# Patient Record
Sex: MALE | Race: WHITE | NOT HISPANIC OR LATINO | Employment: OTHER | URBAN - METROPOLITAN AREA
[De-identification: names, ages, dates, MRNs, and addresses within clinical notes are randomized per-mention and may not be internally consistent; named-entity substitution may affect disease eponyms.]

---

## 2017-02-28 ENCOUNTER — PRE VISIT (OUTPATIENT)
Dept: UROLOGY | Facility: CLINIC | Age: 67
End: 2017-02-28

## 2017-02-28 NOTE — TELEPHONE ENCOUNTER
Patient with BPH coming in for follow up. Chart reviewed and pt was to have UDS and then follow up. Message lef notifying pt that our schedulers would be contacting him to schedule that appointment and reschedule his appointment with Dr. Colorado. Message sent to schedulers.

## 2017-03-03 ENCOUNTER — TRANSFERRED RECORDS (OUTPATIENT)
Dept: HEALTH INFORMATION MANAGEMENT | Facility: CLINIC | Age: 67
End: 2017-03-03

## 2017-03-03 ENCOUNTER — OFFICE VISIT (OUTPATIENT)
Dept: UROLOGY | Facility: CLINIC | Age: 67
End: 2017-03-03

## 2017-03-03 VITALS
HEIGHT: 74 IN | SYSTOLIC BLOOD PRESSURE: 118 MMHG | WEIGHT: 190.4 LBS | BODY MASS INDEX: 24.43 KG/M2 | HEART RATE: 62 BPM | DIASTOLIC BLOOD PRESSURE: 75 MMHG

## 2017-03-03 DIAGNOSIS — N40.1 BENIGN NON-NODULAR PROSTATIC HYPERPLASIA WITH LOWER URINARY TRACT SYMPTOMS: Primary | ICD-10-CM

## 2017-03-03 DIAGNOSIS — R39.15 URGENCY OF URINATION: ICD-10-CM

## 2017-03-03 RX ORDER — TROSPIUM CHLORIDE ER 60 MG/1
60 CAPSULE ORAL EVERY MORNING
Qty: 30 EACH | Refills: 3 | Status: ON HOLD | OUTPATIENT
Start: 2017-03-03 | End: 2019-07-25

## 2017-03-03 RX ORDER — OMEPRAZOLE 40 MG/1
40 CAPSULE, DELAYED RELEASE ORAL
COMMUNITY
Start: 2017-01-27

## 2017-03-03 ASSESSMENT — PAIN SCALES - GENERAL: PAINLEVEL: NO PAIN (0)

## 2017-03-03 NOTE — MR AVS SNAPSHOT
After Visit Summary   3/3/2017    Sidney Preciado    MRN: [id number]           Patient Information     Date Of Birth          1950        Visit Information        Provider Department      3/3/2017 2:00 PM Rolly Colorado MD [facility]        Today's Diagnoses     Benign non-nodular prostatic hyperplasia with lower urinary tract symptoms    -  1    Urgency of urination          Care Instructions    Please follow up with  after doing your UDS   Please get a PSA today         Follow-ups after your visit        Follow-up notes from your care team     Return in about 2 months (around 5/3/2017).      Your next 10 appointments already scheduled     Mar 22, 2017  9:30 AM CDT   (Arrive by 9:15 AM)   Urodynamics with  Uro Procedure Nurse   [facility] ([facility])    [address] [place] [id number] [phone]            Mar 22, 2017 11:40 AM CDT   (Arrive by 11:25 AM)   Return Visit with Rolly Colorado MD   [facility] ([facility])    [address] [place] [id number] [phone]              Who to contact     Please call your clinic at [phone] to:    Ask questions about your health    Make or cancel appointments    Discuss your medicines    Learn about your test results    Speak to your doctor   If you have compliments or concerns about an experience at your clinic, or if you wish to file a complaint, please contact [facility] Patient Relations at [phone] or email us at [email or web address]         Additional Information About Your Visit        MyChart Information     makeenat gives you secure access to your electronic health record. If you see a primary care provider, you can also send  "messages to your care team and make appointments. If you have questions, please call your primary care clinic.  If you do not have a primary care provider, please call [phone] and they will assist you.      Onefeat is an electronic gateway that provides easy, online access to your medical records. With One[employer], you can request a clinic appointment, read your test results, renew a prescription or communicate with your care team.     To access your existing account, please contact your [facility] or call [phone] for assistance.        Care EveryWhere ID     This is your Care EveryWhere ID. This could be used by other organizations to access your [facility] medical records  [id number]        Your Vitals Were     Pulse Height BMI (Body Mass Index)             62 1.88 m (6' 2\") 24.45 kg/m2          Blood Pressure from Last 3 Encounters:   03/03/17 118/75   02/10/16 126/80   09/10/15 100/56    Weight from Last 3 Encounters:   03/03/17 86.4 kg (190 lb 6.4 oz)   02/10/16 84.2 kg (185 lb 11.2 oz)   09/10/15 83 kg (183 lb)                 Today's Medication Changes          These changes are accurate as of: 3/3/17 11:59 PM.  If you have any questions, ask your nurse or doctor.               These medicines have changed or have updated prescriptions.        Dose/Directions    * trospium 60 MG Cp24 24 hr capsule   Commonly known as:  SANCTURA XR   This may have changed:  Another medication with the same name was added. Make sure you understand how and when to take each.   Used for:  Elevated prostate specific antigen (PSA), BPH (benign prostatic hyperplasia)   Changed by:  Rolly Colorado MD        Dose:  60 mg   Take 1 capsule (60 mg) by mouth every morning   Quantity:  90 each   Refills:  0       * trospium 60 MG Cp24 24 hr capsule   Commonly known as:  SANCTURA XR   This may have changed:  You were already taking a medication with the same name, and this prescription was added. " Make sure you understand how and when to take each.   Used for:  Benign non-nodular prostatic hyperplasia with lower urinary tract symptoms, Urgency of urination   Changed by:  Rolly Colorado MD        Dose:  60 mg   Take 1 capsule (60 mg) by mouth every morning   Quantity:  30 each   Refills:  3       * Notice:  This list has 2 medication(s) that are the same as other medications prescribed for you. Read the directions carefully, and ask your doctor or other care provider to review them with you.      Stop taking these medicines if you haven't already. Please contact your care team if you have questions.     ATIVAN PO   Stopped by:  Rolly Colorado MD           dutasteride 0.5 MG capsule   Commonly known as:  AVODART   Stopped by:  Rolly Colorado MD           FLOMAX 0.4 MG capsule   Generic drug:  tamsulosin   Stopped by:  Rolly Colorado MD                Where to get your medicines      These medications were [facility] to [facility] [place] - [address]   [address] [address]    Hours:  24-hours Phone:  [phone]     trospium 60 MG Cp24 24 hr capsule                Primary Care Provider Office Phone # Fax #    [facility] [phone]       [address] [place] [address].  [facility] [address]        Thank you!     Thank you for choosing [facility] UROLOG[facility] AND [facility] FOR PROSTATE AND UROLOGIC CANCERS  for your care. Our goal is always to provide you with excellent care. Hearing back from our patients is one way we can continue to improve our services. Please take a few minutes to complete the written survey that you may receive in the mail after your visit with us. Thank you!             Your Updated Medication List - Protect others around you: Learn how to safely use, store and throw away your medicines at www.disposemymeds.org.          This list is accurate as of: 3/3/17 11:59 PM.  Always use your most recent med list.                    Brand Name Dispense Instructions for use    AMBIEN PO          ciprofloxacin 500 MG tablet    CIPRO    6 tablet    Take one tablet twice daily for 3 days.       diazepam 10 MG tablet    VALIUM    1 tablet    Take 1 tablet (10 mg) by mouth every 6 hours as needed for anxiety or sleep Take 30-60 minutes before procedure.  Do not operate a vehicle after taking this medication.       multivitamin, therapeutic Tabs tablet      Take 1 tablet by mouth daily       omeprazole 40 MG capsule    priLOSEC     Take 40 mg by mouth       * trospium 60 MG Cp24 24 hr capsule    SANCTURA XR    90 each    Take 1 capsule (60 mg) by mouth every morning       * trospium 60 MG Cp24 24 hr capsule    SANCTURA XR    30 each    Take 1 capsule (60 mg) by mouth every morning       * Notice:  This list has 2 medication(s) that are the same as other medications prescribed for you. Read the directions carefully, and ask your doctor or other care provider to review them with you.

## 2017-03-03 NOTE — LETTER
3/3/2017       RE: Sidney Preciado  [address] [facility] [address]     Dear Colleague,    Thank you for referring your patient, Sidney Preciado, to the [facility] UROLOG[facility] AND [facility] FOR PROSTATE AND UROLOGIC CANCERS at [facility]. Please see a copy of my visit note below.    DIAGNOSIS:  BPH.      HISTORY OF PRESENT ILLNESS:  Mr. Preciado is a 66-year-old gentleman whom we saw about a year ago with symptoms of long-standing BPH, significant nocturia and daytime frequency.  He was on tamsulosin, dutasteride and trospium.  He was counseled to undergo a laser TURP, which he was averse to.  He then underwent an MRI in preparation for a PAE, which showed a prostate nodule.  He underwent a biopsy of this, which showed Stan 6 prostate cancer.  He also was recommended to undergo a UDS, but did not comply with that last year.  His prostate was about 85 g, but subsequently he has had a Rezum procedure elsewhere 09/2016.  He says his symptoms got better for a while, but now they are worse.  His biggest complaint is his nocturia.  He has, however, been able to get off his Flomax and dutasteride.  Right now, he is also scheduled for a urodynamic evaluation this month.  He otherwise denies any other significant symptoms, but is not very happy with the results of Rezum.        PHYSICAL EXAMINATION:     VITAL SIGNS:  He is afebrile.  Blood pressure 118/75, pulse 62, weight 86 kg.   ABDOMEN:  Benign.      DATA:  Uroflow today reveals a peak flow of 17.4 mL/sec, on a voided volume 418 mL with good parabolic curve.        ASSESSMENT AND PLAN:  Patient with BPH and the significant main complaint now being nocturia about 5 times a night.  His objective parameters seem to have gotten better given that he has had the Rezum procedure.  Some of the issue may be localized to irritation of the trigone.  He is on trospium and needs a refill, which we will give him today.  I would like to obtain urodynamics to  better understand the function of his bladder before we decide if he needs any additional therapy.       Again, thank you for allowing me to participate in the care of your patient.      Sincerely,    Rolly Colorado MD

## 2017-03-03 NOTE — PROGRESS NOTES
DIAGNOSIS:  BPH.      HISTORY OF PRESENT ILLNESS:  Mr. Preciado is a 66-year-old gentleman whom we saw about a year ago with symptoms of long-standing BPH, significant nocturia and daytime frequency.  He was on tamsulosin, dutasteride and trospium.  He was counseled to undergo a laser TURP, which he was averse to.  He then underwent an MRI in preparation for a PAE, which showed a prostate nodule.  He underwent a biopsy of this, which showed Stan 6 prostate cancer.  He also was recommended to undergo a UDS, but did not comply with that last year.  His prostate was about 85 g, but subsequently he has had a Rezum procedure elsewhere 09/2016.  He says his symptoms got better for a while, but now they are worse.  His biggest complaint is his nocturia.  He has, however, been able to get off his Flomax and dutasteride.  Right now, he is also scheduled for a urodynamic evaluation this month.  He otherwise denies any other significant symptoms, but is not very happy with the results of Rezum.        PHYSICAL EXAMINATION:     VITAL SIGNS:  He is afebrile.  Blood pressure 118/75, pulse 62, weight 86 kg.   ABDOMEN:  Benign.      DATA:  Uroflow today reveals a peak flow of 17.4 mL/sec, on a voided volume 418 mL with good parabolic curve.        ASSESSMENT AND PLAN:  Patient with BPH and the significant main complaint now being nocturia about 5 times a night.  His objective parameters seem to have gotten better given that he has had the Rezum procedure.  Some of the issue may be localized to irritation of the trigone.  He is on trospium and needs a refill, which we will give him today.  I would like to obtain urodynamics to better understand the function of his bladder before we decide if he needs any additional therapy.

## 2017-03-21 ENCOUNTER — PRE VISIT (OUTPATIENT)
Dept: UROLOGY | Facility: CLINIC | Age: 67
End: 2017-03-21

## 2017-03-21 NOTE — TELEPHONE ENCOUNTER
Patient with BPH and Rezum coming in to review UDS. Chart reviewed and all appointments are made. No need for a call.

## 2017-03-22 ENCOUNTER — OFFICE VISIT (OUTPATIENT)
Dept: UROLOGY | Facility: CLINIC | Age: 67
End: 2017-03-22

## 2017-03-22 ENCOUNTER — DOCUMENTATION ONLY (OUTPATIENT)
Dept: UROLOGY | Facility: CLINIC | Age: 67
End: 2017-03-22

## 2017-03-22 VITALS
HEART RATE: 77 BPM | BODY MASS INDEX: 23 KG/M2 | DIASTOLIC BLOOD PRESSURE: 73 MMHG | WEIGHT: 185 LBS | SYSTOLIC BLOOD PRESSURE: 121 MMHG | HEIGHT: 75 IN

## 2017-03-22 VITALS
HEIGHT: 74 IN | WEIGHT: 184 LBS | HEART RATE: 66 BPM | DIASTOLIC BLOOD PRESSURE: 70 MMHG | BODY MASS INDEX: 23.61 KG/M2 | SYSTOLIC BLOOD PRESSURE: 112 MMHG

## 2017-03-22 DIAGNOSIS — N40.1 BENIGN NODULAR PROSTATIC HYPERPLASIA WITH LOWER URINARY TRACT SYMPTOMS: Primary | ICD-10-CM

## 2017-03-22 DIAGNOSIS — N40.0 BPH (BENIGN PROSTATIC HYPERPLASIA): Primary | ICD-10-CM

## 2017-03-22 DIAGNOSIS — C61 MALIGNANT NEOPLASM OF PROSTATE (H): ICD-10-CM

## 2017-03-22 RX ORDER — TROSPIUM CHLORIDE ER 60 MG/1
60 CAPSULE ORAL
Status: ON HOLD | COMMUNITY
Start: 2017-03-03 | End: 2019-07-25

## 2017-03-22 ASSESSMENT — PAIN SCALES - GENERAL
PAINLEVEL: MILD PAIN (3)
PAINLEVEL: NO PAIN (0)
PAINLEVEL: NO PAIN (0)

## 2017-03-22 NOTE — PROGRESS NOTES
Sidney Preciado comes into clinic today at the request of Dr. Colorado for UDS.      This service provided today was under the supervising provider of the day Dr. Colorado, who was available if needed.    Kirit Marsh    INDICATIONS FOR PROCEDURE:  Mr. Sidney Preciado is a pleasant 66 year old male with BPH.     PROCEDURE:    1.Complex filling cystourethrogram with measurement of bladder and rectal pressures.  2.Complex voiding cystourethrogram with measurement of bladder and rectal pressures.  3.Electromyography during urodynamics.  4.Uroflowmetry.  5.Sterile urethral catheterization for measurement of postvoid residual urine volume.    VOIDING DIARY:  Patient did not provide voiding diary.    DESCRIPTION OF PROCEDURE:  Risks, benefits, and alternatives were discussed with the patient and they wished to proceed. Urodynamics is planned to better assess the primary etiology for Mr. Preciado's urologic dysfunction. After informed consent was obtained, the patient was taken to the procedure room where uroflowmetry was performed. Findings below. Next a triple-lumen urodynamics catheter was inserted in the bladder. A rectal manometry catheter was placed in the rectum. EMG pads were placed on either side of the anal verge. The bladder was filled with sterile saline at 25 cc/minute and serial pressures were recorded.     UROFLOW:  Voided volume: 233 ml  Maximum flow rate: 16.0 ml/sec  Average flow rate: 9.2 ml/sec  Post void Residual by catheter: 10 fr coude catheter inserted; no urine return achieved; a second attempt was made using 14 fr tiemann model coude; again no urine.  There was a few mls of bright red blood in catheter upon retraction.  There was no indication that the catheter was not advanced sufficiently to be in bladder. No urine was available for testing.    DURING THE FILLING PHASE:  At the following volumes the patient experienced:  First sensation: not reported   First Desire: 170 ml  Strong Desire: 188  ml  Maximum Capacity: 304 ml   Max Filling Detrusor Pressure: 306    DURING THE VOIDING PHASE:  Peak Detrusor Contraction:74.2 cmH2O  Voided volume: 352.0 ml  Maximum flow rate: 13.6 ml/sec  Average flow rate: 6.1 ml/sec  Post void Residual: 0 ml    A single ciprofloxacin antibiotic was provided for UTI prophylaxis per protocol, following completion of study.         Thank you for allowing me to participate in the care of  Mr. Sidney DOUGHERTY Ilana.  Kirit Marsh LPN    March 22, 2017

## 2017-03-22 NOTE — NURSING NOTE
Chief Complaint   Patient presents with     RECHECK     Follow up after UDS        Yandy Kelsey MA

## 2017-03-22 NOTE — PROGRESS NOTES
"Urology Clinic Note    Date: 3/22/2017  Time: 11:41 AM  Patient: Sidney Preciado  MRN: [id number]    HPI/Subjective:  Mr. Preciado is a 66-year-old man with a urologic history of very low risk prostate cancer diagnosed in September 2015  (Stan 3 + 3, 2 cores, PSA 2.53) on AS and bothersome LUTS s/p Rezum (9/2016) with persistent post-procedure LUTS who presents to review UDS results.      Initial urinary symptoms included weak stream, frequency (Q1H), urgency and nocturia (5-6X).  Since underoing Rezum the patient reports improved force of stream with minimal improvements in frequency (Q1.5 H), urgency and nocturia (4x).  He is currently on trospium and dutasteride.  In light of persistent symptoms, the patient returns today to review UDS results.    Today he denies recent fevers, chills, dysuria, hematuria, flanks pain and sensation of incomplete emptying.         /70  Pulse 66  Ht 1.88 m (6' 2\")  Wt 83.5 kg (184 lb)  BMI 23.62 kg/m2  Gen: In NAD, conversant  Resp: Breathing non-labored  Abd: Soft, non-distended, non-tender.  Ext: Warm and well perfused    UROFLOW:  Voided volume: 233 ml  Maximum flow rate: 16.0 ml/sec  Average flow rate: 9.2 ml/sec    DURING THE FILLING PHASE:  At the following volumes the patient experienced:  First sensation: not reported   First Desire: 170 ml  Strong Desire: 188 ml  Maximum Capacity: 304 ml   Max Filling Detrusor Pressure: 306     DURING THE VOIDING PHASE:  Peak Detrusor Contraction:74.2 cmH2O  Voided volume: 352.0 ml  Maximum flow rate: 13.6 ml/sec  Average flow rate: 6.1 ml/sec  Post void Residual: 0 ml       Assessment & Plan: Sidney Preciado is a 66 year old MALE with history of very low risk prostate cancer and persistent LUTS s/p Rezum who returns to clinic today for follow up after undergoing UDS.    1)  Prostate cancer  -Very low risk disease on active surveillance  -Repeat PSA today    2) LUTS  -Patient with known moderate prostatic enlargement and persistent " LUTS with UDS findings suggestive of obstructive voiding (decreased flow with increased voiding pressures) without evidence of associated detrusor overactivity.  In light of the foregoing, we will plan to perform cystoscopy to evaluate for residual obstruction in anticipation of possible surgical intervention.        Patient seen and examined with the resident.  Visit time 15 minutes and >50% spent in counseling.  I agree with the resident's note and plan of care.       Rolly Colorado MD  Urology Staff

## 2017-03-22 NOTE — LETTER
"3/22/2017       RE: Sidney Preciado  [address] [address]     Dear Colleague,    Thank you for referring your patient, Sidney Preciado, to the [facility] UROLOGY AND [facility] FOR PROSTATE AND UROLOGIC CANCERS at [facility]. Please see a copy of my visit note below.    Urology Clinic Note    Date: 3/22/2017  Time: 11:41 AM  Patient: Sidney Preciado  MRN: [id number]    HPI/Subjective:  Mr. Preciado is a 66-year-old man with a urologic history of very low risk prostate cancer diagnosed in September 2015  ([place] 3 + 3, 2 cores, PSA 2.53) on AS and bothersome LUTS s/p Rezum (9/2016) with persistent post-procedure LUTS who presents to review UDS results.      Initial urinary symptoms included weak stream, frequency (Q1H), urgency and nocturia (5-6X).  Since underoing Rezum the patient reports improved force of stream with minimal improvements in frequency (Q1.5 H), urgency and nocturia (4x).  He is currently on trospium and dutasteride.  In light of persistent symptoms, the patient returns today to review UDS results.    Today he denies recent fevers, chills, dysuria, hematuria, flanks pain and sensation of incomplete emptying.         /70  Pulse 66  Ht 1.88 m (6' 2\")  Wt 83.5 kg (184 lb)  BMI 23.62 kg/m2  Gen: In NAD, conversant  Resp: Breathing non-labored  Abd: Soft, non-distended, non-tender.  Ext: Warm and well perfused    UROFLOW:  Voided volume: 233 ml  Maximum flow rate: 16.0 ml/sec  Average flow rate: 9.2 ml/sec    DURING THE FILLING PHASE:  At the following volumes the patient experienced:  First sensation: not reported   First Desire: 170 ml  Strong Desire: 188 ml  Maximum Capacity: 304 ml   Max Filling Detrusor Pressure: 306     DURING THE VOIDING PHASE:  Peak Detrusor Contraction:74.2 cmH2O  Voided volume: 352.0 ml  Maximum flow rate: 13.6 ml/sec  Average flow rate: 6.1 ml/sec  Post void Residual: 0 ml       Assessment & Plan: Sidney Preciado is a 66 year old " MALE with history of very low risk prostate cancer and persistent LUTS s/p Rezum who returns to clinic today for follow up after undergoing UDS.    1)  Prostate cancer  -Very low risk disease on active surveillance  -Repeat PSA today    2) LUTS  -Patient with known moderate prostatic enlargement and persistent LUTS with UDS findings suggestive of obstructive voiding (decreased flow with increased voiding pressures) without evidence of associated detrusor overactivity.  In light of the foregoing, we will plan to perform cystoscopy to evaluate for residual obstruction in anticipation of possible surgical intervention.        Patient seen and examined with the resident.  Visit time 15 minutes and >50% spent in counseling.  I agree with the resident's note and plan of care.       Rolly Colorado MD  Urology Staff

## 2017-03-22 NOTE — MR AVS SNAPSHOT
After Visit Summary   3/22/2017    Sidney Preciado    MRN: [id number]           Patient Information     Date Of Birth          1950        Visit Information        Provider Department      3/22/2017 11:40 AM Rolly Colorado MD [facility] Urology and [facility] for Prostate and Urologic Cancers        Care Instructions    Please follow up with  for a cystoscopy and PSA     It was a pleasure meeting with you today.  Thank you for allowing me and my team the privilege of caring for you today.  YOU are the reason we are here, and I truly hope we provided you with the excellent service you deserve.  Please let us know if there is anything else we can do for you so that we can be sure you are leaving completely satisfied with your care experience.                Follow-ups after your visit        Your next 10 appointments already scheduled     Apr 26, 2017 10:00 AM CDT   LAB with  LAB   [facility] ([facility])    [address] [place] [address] [phone]           Patient must bring picture ID.  Patient should be prepared to give a urine specimen  Please do not eat 10-12 hours before your appointment if you are coming in fasting for labs on lipids, cholesterol, or glucose (sugar).  Pregnant women should follow their Care Team instructions. Water with medications is okay. Do not drink coffee or other fluids.   If you have concerns about taking  your medications, please ask at office or if scheduling via [facility], send a message by clicking on Secure Messaging, Message Your Care Team.            Apr 26, 2017 12:00 PM CDT   (Arrive by 11:45 AM)   Cystoscopy with Rolly Colorado MD   [facility] Urology and [facility] for Prostate and Urologic Cancers ([facility])    [address] [place] [address] [phone]              Who to contact     Please call your clinic at [phone] to:    Ask  "questions about your health    Make or cancel appointments    Discuss your medicines    Learn about your test results    Speak to your doctor   If you have compliments or concerns about an experience at your clinic, or if you wish to file a complaint, please contact [facility] Patient Relations at [phone] or email us at [email or web address]         Additional Information About Your Visit        MyChart Information     [employer] gives you secure access to your electronic health record. If you see a primary care provider, you can also send messages to your care team and make appointments. If you have questions, please call your primary care clinic.  If you do not have a primary care provider, please call [phone] and they will assist you.      [email or web address] is an electronic gateway that provides easy, online access to your medical records. With [email or web address], you can request a clinic appointment, read your test results, renew a prescription or communicate with your care team.     To access your existing account, please contact your [facility] or call [phone] for assistance.        Care EveryWhere ID     This is your Care EveryWhere ID. This could be used by other organizations to access your [place] medical records  [id number]        Your Vitals Were     Pulse Height BMI (Body Mass Index)             66 1.88 m (6' 2\") 23.62 kg/m2          Blood Pressure from Last 3 Encounters:   03/22/17 112/70   03/22/17 121/73   03/03/17 118/75    Weight from Last 3 Encounters:   03/22/17 83.5 kg (184 lb)   03/22/17 83.9 kg (185 lb)   03/03/17 86.4 kg (190 lb 6.4 oz)              Today, you had the following     No orders found for display       Primary Care Provider Office Phone # Fax #    Josselyn [facility] [phone]       [facility]  [facility] [address]        Thank you!     Thank you for choosing [facility] UROLOGY AND [facility] FOR PROSTATE " AND UROLOGIC CANCERS  for your care. Our goal is always to provide you with excellent care. Hearing back from our patients is one way we can continue to improve our services. Please take a few minutes to complete the written survey that you may receive in the mail after your visit with us. Thank you!             Your Updated Medication List - Protect others around you: Learn how to safely use, store and throw away your medicines at www.disposemymeds.org.          This list is accurate as of: 3/22/17 12:52 PM.  Always use your most recent med list.                   Brand Name Dispense Instructions for use    AMBIEN PO          ciprofloxacin 500 MG tablet    CIPRO    6 tablet    Take one tablet twice daily for 3 days.       diazepam 10 MG tablet    VALIUM    1 tablet    Take 1 tablet (10 mg) by mouth every 6 hours as needed for anxiety or sleep Take 30-60 minutes before procedure.  Do not operate a vehicle after taking this medication.       multivitamin, therapeutic Tabs tablet      Take 1 tablet by mouth daily       omeprazole 40 MG capsule    priLOSEC     Take 40 mg by mouth       * trospium 60 MG Cp24 24 hr capsule    SANCTURA XR    90 each    Take 1 capsule (60 mg) by mouth every morning       * trospium 60 MG Cp24 24 hr capsule    SANCTURA XR    30 each    Take 1 capsule (60 mg) by mouth every morning       * trospium 60 MG Cp24 24 hr capsule    SANCTURA XR     Take 60 mg by mouth       * Notice:  This list has 3 medication(s) that are the same as other medications prescribed for you. Read the directions carefully, and ask your doctor or other care provider to review them with you.

## 2017-03-22 NOTE — PATIENT INSTRUCTIONS
Please follow up with  for a cystoscopy and PSA     It was a pleasure meeting with you today.  Thank you for allowing me and my team the privilege of caring for you today.  YOU are the reason we are here, and I truly hope we provided you with the excellent service you deserve.  Please let us know if there is anything else we can do for you so that we can be sure you are leaving completely satisfied with your care experience.

## 2017-03-22 NOTE — MR AVS SNAPSHOT
After Visit Summary   3/22/2017    Sidney Preciado    MRN: [id number]           Patient Information     Date Of Birth          1950        Visit Information        Provider Department      3/22/2017 9:30 AM Nurse, Uc Uro Procedure [facility]rology and [facility] for Prostate and Urologic Cancers        Today's Diagnoses     BPH (benign prostatic hyperplasia)    -  1       Follow-ups after your visit        Your next 10 appointments already scheduled     Mar 22, 2017 11:40 AM CDT   (Arrive by 11:25 AM)   Return Visit with Rolly Colorado MD   [facility] and [facility] for Prostate and Urologic Cancers ([facility])    [address]  [address] [facility] [address] [phone]              Who to contact     Please call your clinic at [phone] to:    Ask questions about your health    Make or cancel appointments    Discuss your medicines    Learn about your test results    Speak to your doctor   If you have compliments or concerns about an experience at your clinic, or if you wish to file a complaint, please contact [facility] Patient Relations at [phone] or email us at [email or web address]         Additional Information About Your Visit        MyChart Information     [employer] gives you secure access to your electronic health record. If you see a primary care provider, you can also send messages to your care team and make appointments. If you have questions, please call your primary care clinic.  If you do not have a primary care provider, please call [phone] and they will assist you.      [employer] is an electronic gateway that provides easy, online access to your medical records. With [employer], you can request a clinic appointment, read your test results, renew a prescription or communicate with your care team.     To access your existing account, please contact your [facility] or call  "[phone] for assistance.        Care EveryWhere ID     This is your Care EveryWhere ID. This could be used by other organizations to access your [place] medical records  [id number]        Your Vitals Were     Pulse Height BMI (Body Mass Index)             77 1.905 m (6' 3\") 23.12 kg/m2          Blood Pressure from Last 3 Encounters:   03/22/17 121/73   03/03/17 118/75   02/10/16 126/80    Weight from Last 3 Encounters:   03/22/17 83.9 kg (185 lb)   03/03/17 86.4 kg (190 lb 6.4 oz)   02/10/16 84.2 kg (185 lb 11.2 oz)              We Performed the Following     COMPLEX UROFLOWMETRY     CYSTOMETROGRAM COMPLEX W VOIDING PRESS PROFILE     EMG ANAL/URETH SPHINCTER, OTHER THAN NEEDLE     VOIDING PRESSURE STUDY, INTRA-ABDOMINAL        Primary Care Provider Office Phone # Fax #    Josselyn [facility] [phone]       [facility]  [facility] [address]        Thank you!     Thank you for choosing [facility] UROLOGY AND [facility] FOR PROSTATE AND UROLOGIC CANCERS  for your care. Our goal is always to provide you with excellent care. Hearing back from our patients is one way we can continue to improve our services. Please take a few minutes to complete the written survey that you may receive in the mail after your visit with us. Thank you!             Your Updated Medication List - Protect others around you: Learn how to safely use, store and throw away your medicines at www.disposemymeds.org.          This list is accurate as of: 3/22/17 10:59 AM.  Always use your most recent med list.                   Brand Name Dispense Instructions for use    AMBIEN PO          ciprofloxacin 500 MG tablet    CIPRO    6 tablet    Take one tablet twice daily for 3 days.       diazepam 10 MG tablet    VALIUM    1 tablet    Take 1 tablet (10 mg) by mouth every 6 hours as needed for anxiety or sleep Take 30-60 minutes before procedure.  Do not operate a vehicle after taking this medication.       multivitamin, therapeutic " Tabs tablet      Take 1 tablet by mouth daily       omeprazole 40 MG capsule    priLOSEC     Take 40 mg by mouth       * trospium 60 MG Cp24 24 hr capsule    SANCTURA XR    90 each    Take 1 capsule (60 mg) by mouth every morning       * trospium 60 MG Cp24 24 hr capsule    SANCTURA XR    30 each    Take 1 capsule (60 mg) by mouth every morning       * trospium 60 MG Cp24 24 hr capsule    SANCTURA XR     Take 60 mg by mouth       * Notice:  This list has 3 medication(s) that are the same as other medications prescribed for you. Read the directions carefully, and ask your doctor or other care provider to review them with you.

## 2017-04-05 PROBLEM — C61 MALIGNANT NEOPLASM OF PROSTATE (H): Status: ACTIVE | Noted: 2017-04-05

## 2017-04-18 ENCOUNTER — PRE VISIT (OUTPATIENT)
Dept: UROLOGY | Facility: CLINIC | Age: 67
End: 2017-04-18

## 2017-04-18 NOTE — TELEPHONE ENCOUNTER
Patient with prostate cancer and LUTS coming in for a cystoscopy and to review PSA. Chart reviewed and all appointments are made. No need for a call.

## 2017-04-26 ENCOUNTER — OFFICE VISIT (OUTPATIENT)
Dept: UROLOGY | Facility: CLINIC | Age: 67
End: 2017-04-26

## 2017-04-26 VITALS
HEIGHT: 75 IN | DIASTOLIC BLOOD PRESSURE: 74 MMHG | HEART RATE: 64 BPM | WEIGHT: 184 LBS | BODY MASS INDEX: 22.88 KG/M2 | SYSTOLIC BLOOD PRESSURE: 111 MMHG

## 2017-04-26 DIAGNOSIS — N40.1 BENIGN NODULAR PROSTATIC HYPERPLASIA WITH LOWER URINARY TRACT SYMPTOMS: Primary | ICD-10-CM

## 2017-04-26 DIAGNOSIS — N40.1 BENIGN NON-NODULAR PROSTATIC HYPERPLASIA WITH LOWER URINARY TRACT SYMPTOMS: ICD-10-CM

## 2017-04-26 LAB — PSA SERPL-MCNC: 3.02 UG/L (ref 0–4)

## 2017-04-26 ASSESSMENT — PAIN SCALES - GENERAL
PAINLEVEL: NO PAIN (0)
PAINLEVEL: NO PAIN (0)

## 2017-04-26 NOTE — LETTER
4/26/2017       RE: Sidney Preciado  [address] [facility] [address]     Dear Colleague,    Thank you for referring your patient, Sidney Preciado, to the [facility] UROLOGY AND [facility] FOR PROSTATE AND UROLOGIC CANCERS [facility]. Please see a copy of my visit note below.    Pre-procedure diagnosis: Prostate cancer BPH  Post procedure diagnosis: abnormal cystoscopy  Procedure performed: cystoscopy  Surgeon: DOMINICK Colorado MD  Anesthesia: local    Indications for procedure: Patient is a 66 year old male with a history of low risk prostate cancer BPH post REZUM.  Here today for cysto    Description of procedure: After fully informed voluntary consent was obtained patient was brought into the procedure room, identified and placed in a supine position on the cysto table.  The groin/scrotum were prepped and draped in a sterile fashion with betadine.  A 15F flexible cystoscope was inserted into the urethra and the bladder and urethra examined in a systematic manner.  There were no tumor stones or diverticula.  Ureteric orifices were normal in position and number and effluxing clear urine.  The prostate was 4 cm long and showed bilobar hypertrophy with a defefct on the left side probably from the REZUM.  There was a small median lobe.  Distal urethra was normal.  The patient tolerated the procedure well and there were no complications.      Assessment/Plan: Patient with a history of prostate cancer and BPH with abnormal cystoscopy.  The options for symptom relief would be a TURP with bipolar or laser.  Since he also has low risk prostate cancer, a robotic prostatectomy would also be an option.  Patient will think options over and call for appointment      Rolly Colorado MD

## 2017-04-26 NOTE — MR AVS SNAPSHOT
After Visit Summary   4/26/2017    Sidney Preciado    MRN: [id number]           Patient Information     Date Of Birth          1950        Visit Information        Provider Department      4/26/2017 12:00 PM Rolly Colorado MD [facility] Urology and [facility] for Prostate and Urologic Cancers        Today's Diagnoses     Benign nodular prostatic hyperplasia with lower urinary tract symptoms    -  1       Follow-ups after your visit        Follow-up notes from your care team     Return in about 1 month (around 5/26/2017).      Who to contact     Please call your clinic at [phone] to:    Ask questions about your health    Make or cancel appointments    Discuss your medicines    Learn about your test results    Speak to your doctor   If you have compliments or concerns about an experience at your clinic, or if you wish to file a complaint, please contact [facility] Patient Relations at [phone] or email us at [email or web address]         Additional Information About Your Visit        [employer] Information     [employer] gives you secure access to your electronic health record. If you see a primary care provider, you can also send messages to your care team and make appointments. If you have questions, please call your primary care clinic.  If you do not have a primary care provider, please call [phone] and they will assist you.      O[employer] is an electronic gateway that provides easy, online access to your medical records. With [employer], you can request a clinic appointment, read your test results, renew a prescription or communicate with your care team.     To access your existing account, please contact your [facility] Clinic or call [phone] for assistance.        Care EveryWhere ID     This is your Care EveryWhere ID. This could be used by other organizations to access your [place] medical records  [id number]        Your  "Vitals Were     Pulse Height BMI (Body Mass Index)             64 1.905 m (6' 3\") 23 kg/m2          Blood Pressure from Last 3 Encounters:   04/26/17 111/74   03/22/17 112/70   03/22/17 121/73    Weight from Last 3 Encounters:   04/26/17 83.5 kg (184 lb)   03/22/17 83.5 kg (184 lb)   03/22/17 83.9 kg (185 lb)              We Performed the Following     CYSTOURETHROSCOPY        Primary Care Provider Office Phone # Fax #    Josselyn [facility] [phone]       [address]  [facility] [address]        Thank you!     Thank you for choosing [facility] UROLOGY AND [facility] FOR PROSTATE AND UROLOGIC CANCERS  for your care. Our goal is always to provide you with excellent care. Hearing back from our patients is one way we can continue to improve our services. Please take a few minutes to complete the written survey that you may receive in the mail after your visit with us. Thank you!             Your Updated Medication List - Protect others around you: Learn how to safely use, store and throw away your medicines at www.disposemymeds.org.          This list is accurate as of: 4/26/17  2:22 PM.  Always use your most recent med list.                   Brand Name Dispense Instructions for use    AMBIEN PO      Reported on 4/26/2017       ciprofloxacin 500 MG tablet    CIPRO    6 tablet    Take one tablet twice daily for 3 days.       diazepam 10 MG tablet    VALIUM    1 tablet    Take 1 tablet (10 mg) by mouth every 6 hours as needed for anxiety or sleep Take 30-60 minutes before procedure.  Do not operate a vehicle after taking this medication.       multivitamin, therapeutic Tabs tablet      Take 1 tablet by mouth daily Reported on 4/26/2017       omeprazole 40 MG capsule    priLOSEC     Take 40 mg by mouth Reported on 4/26/2017       * trospium 60 MG Cp24 24 hr capsule    SANCTURA XR    90 each    Take 1 capsule (60 mg) by mouth every morning       * trospium 60 MG Cp24 24 hr capsule    SANCTURA XR    30 each "    Take 1 capsule (60 mg) by mouth every morning       * trospium 60 MG Cp24 24 hr capsule    SANCTURA XR     Take 60 mg by mouth Reported on 4/26/2017       * Notice:  This list has 3 medication(s) that are the same as other medications prescribed for you. Read the directions carefully, and ask your doctor or other care provider to review them with you.

## 2017-04-26 NOTE — PROGRESS NOTES
Pre-procedure diagnosis: Prostate cancer BPH  Post procedure diagnosis: abnormal cystoscopy  Procedure performed: cystoscopy  Surgeon: DOMINICK Colorado MD  Anesthesia: local    Indications for procedure: Patient is a 66 year old male with a history of low risk prostate cancer BPH post REZUM.  Here today for cysto    Description of procedure: After fully informed voluntary consent was obtained patient was brought into the procedure room, identified and placed in a supine position on the cysto table.  The groin/scrotum were prepped and draped in a sterile fashion with betadine.  A 15F flexible cystoscope was inserted into the urethra and the bladder and urethra examined in a systematic manner.  There were no tumor stones or diverticula.  Ureteric orifices were normal in position and number and effluxing clear urine.  The prostate was 4 cm long and showed bilobar hypertrophy with a defefct on the left side probably from the REZUM.  There was a small median lobe.  Distal urethra was normal.  The patient tolerated the procedure well and there were no complications.      Assessment/Plan: Patient with a history of prostate cancer and BPH with abnormal cystoscopy.  The options for symptom relief would be a TURP with bipolar or laser.  Since he also has low risk prostate cancer, a robotic prostatectomy would also be an option.  Patient will think options over and call for appointment

## 2017-04-26 NOTE — NURSING NOTE
Invasive Procedure Safety Checklist:    Procedure: cysto     Action: Complete sections and checkboxes as appropriate.    Pre-procedure:  1. Patient ID Verified with 2 identifiers (Estela and  or MRN) : YES    2. Procedure and site verified with patient/designee (when able) : YES    3. Accurate consent documentation in medical record : YES    4. H&P (or appropriate assessment) documented in medical record : NO  H&P must be up to 30 days prior to procedure an updated within 24 hours of                 Procedure as applicable.     5. Relevant diagnostic and radiology test results appropriately labeled and displayed as applicable : YES    6. Blood products, implants, devices, and/or special equipment available for the procedure as applicable : YES    7. Procedure site(s) marked with provider initials [Exclusions: none] : NO    8. Marking not required. Reason : Yes  Procedure does not require site marking    Time Out:     Time-Out performed immediately prior to starting procedure, including verbal and active participation of all team members addressing: YES    1. Correct patient identity.  2. Confirmed that the correct side and site are marked.  3. An accurate procedure to be done.  4. Agreement on the procedure to be done.  5. Correct patient position.  6. Relevant images and results are properly labeled and appropriately displayed.  7. The need to administer antibiotics or fluids for irrigation purposes during the procedure as applicable.  8. Safety precautions based on patient history or medication use.    During Procedure: Verification of correct person, site, and procedure occurs any time the responsibility for care of the patient is transferred to another member of the care team.

## 2017-04-26 NOTE — NURSING NOTE
Chief Complaint   Patient presents with     Cystoscopy     prostate cancer and LUTS        Yandy Kelsey MA

## 2018-07-16 ENCOUNTER — PRE VISIT (OUTPATIENT)
Dept: UROLOGY | Facility: CLINIC | Age: 68
End: 2018-07-16

## 2018-07-16 NOTE — TELEPHONE ENCOUNTER
Reason for visit: cystoscopy    Relevant information: bladder cancer     [facility]    Pt called: Yes    Rooming: cytology

## 2018-07-18 ENCOUNTER — OFFICE VISIT (OUTPATIENT)
Dept: UROLOGY | Facility: CLINIC | Age: 68
End: 2018-07-18
Payer: COMMERCIAL

## 2018-07-18 VITALS
BODY MASS INDEX: 22.42 KG/M2 | SYSTOLIC BLOOD PRESSURE: 103 MMHG | WEIGHT: 180.3 LBS | HEART RATE: 67 BPM | HEIGHT: 75 IN | DIASTOLIC BLOOD PRESSURE: 72 MMHG

## 2018-07-18 DIAGNOSIS — N13.8 BENIGN PROSTATIC HYPERPLASIA WITH URINARY OBSTRUCTION: Primary | ICD-10-CM

## 2018-07-18 DIAGNOSIS — N40.1 BENIGN PROSTATIC HYPERPLASIA WITH URINARY OBSTRUCTION: Primary | ICD-10-CM

## 2018-07-18 ASSESSMENT — PAIN SCALES - GENERAL
PAINLEVEL: NO PAIN (0)
PAINLEVEL: NO PAIN (0)

## 2018-07-18 NOTE — NURSING NOTE
Chief Complaint   Patient presents with     Cystoscopy     BPH         Invasive Procedure Safety Checklist:    Procedure: cystoscopy    Action: Complete sections and checkboxes as appropriate.    Pre-procedure:  1. Patient ID Verified with 2 identifiers (Estela and  or MRN) : YES    2. Procedure and site verified with patient/designee (when able) : YES    3. Accurate consent documentation in medical record : YES    4. H&P (or appropriate assessment) documented in medical record : NO  H&P must be up to 30 days prior to procedure an updated within 24 hours of                 Procedure as applicable.     5. Relevant diagnostic and radiology test results appropriately labeled and displayed as applicable : NO    6. Blood products, implants, devices, and/or special equipment available for the procedure as applicable : NO    7. Procedure site(s) marked with provider initials [Exclusions: yes] : YES    8. Marking not required. Reason : Yes  Procedure does not require site marking    Time Out:     Time-Out performed immediately prior to starting procedure, including verbal and active participation of all team members addressing: YES    1. Correct patient identity.  2. Confirmed that the correct side and site are marked.  3. An accurate procedure to be done.  4. Agreement on the procedure to be done.  5. Correct patient position.  6. Relevant images and results are properly labeled and appropriately displayed.  7. The need to administer antibiotics or fluids for irrigation purposes during the procedure as applicable.  8. Safety precautions based on patient history or medication use.    During Procedure: Verification of correct person, site, and procedure occurs any time the responsibility for care of the patient is transferred to another member of the care team.      The following medication was given:     MEDICATION:  Lidocaine hcl jelly usp,2%   ROUTE: through urethral  SITE: urethral  DOSE: 10 ml  LOT #:  [id number]  : IMS,Limited  EXPIRATION DATE: 3-20  NDC#: [address]-6   Was there drug waste? No  Multi-dose vial: Christy Wetzel CMA  July 18, 2018

## 2018-07-18 NOTE — LETTER
7/18/2018       RE: Sidney Preciado  [address] [facility] [address]     Dear Colleague,    Thank you for referring your patient, Sidney Preciado, to the [facility] UROLOGY AND [facility] FOR PROSTATE AND UROLOGIC CANCERS at [facility]. Please see a copy of my visit note below.        Pre-procedure diagnosis: Prostate cancer BPH  Post procedure diagnosis: normal cystoscopy  Procedure performed: cystoscopy  Surgeon: DOMINICK Colorado MD  Anesthesia: local     Indications for procedure: Patient is a 66 year old male with a history of low risk prostate cancer BPH post REZUM.  Here today for cysto     Description of procedure: After fully informed voluntary consent was obtained patient was brought into the procedure room, identified and placed in a supine position on the cysto table.  The groin/scrotum were prepped and draped in a sterile fashion with betadine.  A 15F flexible cystoscope was inserted into the urethra and the bladder and urethra examined in a systematic manner.  There were no tumor stones or diverticula.  Ureteric orifices were normal in position and number and effluxing clear urine.  The prostate was 4 cm long and showed bilobar hypertrophy with a defefct on the left side probably from the REZUM.  There was a small median lobe.  Distal urethra was normal.  The patient tolerated the procedure well and there were no complications.       Assessment/Plan: Patient with a history of prostate cancer and BPH with abnormal cystoscopy.  The options for symptom relief would be a TURP with bipolar or laser.  Since he also has low risk prostate cancer, a robotic prostatectomy would also be an option.   Patient feels that his current status is tolerable and would like to hold off on any more procedures.  PVR = 0 and Max flow rate 10.6ml/s.  F/u in 6 months     Again, thank you for allowing me to participate in the care of your patient.      Sincerely,    Rolly Colorado MD

## 2018-07-18 NOTE — PATIENT INSTRUCTIONS
"Schedule 6 month cystoscopy with Dr. Colorado      It was a pleasure meeting with you today.  Thank you for allowing me and my team the privilege of caring for you today.  YOU are the reason we are here, and I truly hope we provided you with the excellent service you deserve.  Please let us know if there is anything else we can do for you so that we can be sure you are leaving completely satisfied with your care experience.            Nina Wetzel MA     AFTER YOUR CYSTOSCOPY        You have just completed a cystoscopy, or \"cysto\", which allowed your physician to learn more about your bladder (or to remove a stent placed after surgery). We suggest that you continue to avoid caffeine, fruit juice, and alcohol for the next 24 hours, however, you are encouraged to return to your normal activities.         A few things that are considered normal after your cystoscopy:     * Small amount of bleeding (or spotting) that clears within the next 24 hours     * Slight burning sensation with urination     * Sensation to of needing to avoid more frequently     * The feeling of \"air\" in your urine     * Mild discomfort that is relieved with Tylenol        Please contact our office promptly if you:     * Develop a fever above 101 degrees     * Are unable to urinate     * Develop bright red blood that does not stop     * Severe pain or swelling         Please contact our office with any concerns or questions @DEPTPHN.  "

## 2018-07-18 NOTE — PROGRESS NOTES
Pre-procedure diagnosis: Prostate cancer BPH  Post procedure diagnosis: normal cystoscopy  Procedure performed: cystoscopy  Surgeon: DOMINICK Colorado MD  Anesthesia: local     Indications for procedure: Patient is a 66 year old male with a history of low risk prostate cancer BPH post REZUM.  Here today for cysto     Description of procedure: After fully informed voluntary consent was obtained patient was brought into the procedure room, identified and placed in a supine position on the cysto table.  The groin/scrotum were prepped and draped in a sterile fashion with betadine.  A 15F flexible cystoscope was inserted into the urethra and the bladder and urethra examined in a systematic manner.  There were no tumor stones or diverticula.  Ureteric orifices were normal in position and number and effluxing clear urine.  The prostate was 4 cm long and showed bilobar hypertrophy with a defefct on the left side probably from the REZUM.  There was a small median lobe.  Distal urethra was normal.  The patient tolerated the procedure well and there were no complications.       Assessment/Plan: Patient with a history of prostate cancer and BPH with abnormal cystoscopy.  The options for symptom relief would be a TURP with bipolar or laser.  Since he also has low risk prostate cancer, a robotic prostatectomy would also be an option.  Patient feels that his current status is tolerable and would like to hold off on any more procedures.  PVR = 0 and Max flow rate 10.6ml/s.  F/u in 6 months

## 2018-10-02 ENCOUNTER — RECORDS - HEALTHEAST (OUTPATIENT)
Dept: ADMINISTRATIVE | Facility: OTHER | Age: 68
End: 2018-10-02

## 2018-10-02 LAB
LAB AP CHARGES (HE HISTORICAL CONVERSION): NORMAL
PATH REPORT.COMMENTS IMP SPEC: NORMAL
PATH REPORT.FINAL DX SPEC: NORMAL
PATH REPORT.GROSS SPEC: NORMAL
PATH REPORT.MICROSCOPIC SPEC OTHER STN: NORMAL
PATH REPORT.RELEVANT HX SPEC: NORMAL
RESULT FLAG (HE HISTORICAL CONVERSION): NORMAL

## 2019-01-15 ENCOUNTER — PATIENT OUTREACH (OUTPATIENT)
Dept: CARE COORDINATION | Facility: CLINIC | Age: 69
End: 2019-01-15

## 2019-01-21 ENCOUNTER — PRE VISIT (OUTPATIENT)
Dept: UROLOGY | Facility: CLINIC | Age: 69
End: 2019-01-21

## 2019-05-08 ENCOUNTER — PRE VISIT (OUTPATIENT)
Dept: UROLOGY | Facility: CLINIC | Age: 69
End: 2019-05-08

## 2019-05-08 NOTE — TELEPHONE ENCOUNTER
Chief Complaint : cysto BPH    New Hx/Sx: low risk prostate cancer     Records/Orders/Proced: na      Pt Contacted: no    At Rooming: urine

## 2019-05-30 ENCOUNTER — OFFICE VISIT (OUTPATIENT)
Dept: UROLOGY | Facility: CLINIC | Age: 69
End: 2019-05-30
Payer: COMMERCIAL

## 2019-05-30 VITALS
HEIGHT: 75 IN | DIASTOLIC BLOOD PRESSURE: 78 MMHG | SYSTOLIC BLOOD PRESSURE: 129 MMHG | BODY MASS INDEX: 21.14 KG/M2 | HEART RATE: 62 BPM | WEIGHT: 170 LBS

## 2019-05-30 DIAGNOSIS — N40.1 BENIGN PROSTATIC HYPERPLASIA WITH URINARY OBSTRUCTION: Primary | ICD-10-CM

## 2019-05-30 DIAGNOSIS — N13.8 BENIGN PROSTATIC HYPERPLASIA WITH URINARY OBSTRUCTION: Primary | ICD-10-CM

## 2019-05-30 LAB
ALBUMIN UR-MCNC: NEGATIVE MG/DL
APPEARANCE UR: CLEAR
BACTERIA #/AREA URNS HPF: ABNORMAL /HPF
BILIRUB UR QL STRIP: NEGATIVE
COLOR UR AUTO: YELLOW
GLUCOSE UR STRIP-MCNC: NEGATIVE MG/DL
HGB UR QL STRIP: NEGATIVE
KETONES UR STRIP-MCNC: NEGATIVE MG/DL
LEUKOCYTE ESTERASE UR QL STRIP: NEGATIVE
NITRATE UR QL: NEGATIVE
PH UR STRIP: 7 PH (ref 5–7)
RBC #/AREA URNS AUTO: <1 /HPF (ref 0–2)
SOURCE: ABNORMAL
SP GR UR STRIP: 1.01 (ref 1–1.03)
SQUAMOUS #/AREA URNS AUTO: <1 /HPF (ref 0–1)
UROBILINOGEN UR STRIP-MCNC: 0 MG/DL (ref 0–2)
WBC #/AREA URNS AUTO: 1 /HPF (ref 0–5)

## 2019-05-30 RX ORDER — LIDOCAINE HYDROCHLORIDE 20 MG/ML
JELLY TOPICAL ONCE
Status: COMPLETED | OUTPATIENT
Start: 2019-05-30 | End: 2019-05-30

## 2019-05-30 RX ADMIN — LIDOCAINE HYDROCHLORIDE: 20 JELLY TOPICAL at 16:55

## 2019-05-30 ASSESSMENT — PATIENT HEALTH QUESTIONNAIRE - PHQ9
SUM OF ALL RESPONSES TO PHQ QUESTIONS 1-9: 3
SUM OF ALL RESPONSES TO PHQ QUESTIONS 1-9: 3
10. IF YOU CHECKED OFF ANY PROBLEMS, HOW DIFFICULT HAVE THESE PROBLEMS MADE IT FOR YOU TO DO YOUR WORK, TAKE CARE OF THINGS AT HOME, OR GET ALONG WITH OTHER PEOPLE: NOT DIFFICULT AT ALL

## 2019-05-30 ASSESSMENT — PAIN SCALES - GENERAL: PAINLEVEL: NO PAIN (0)

## 2019-05-30 ASSESSMENT — MIFFLIN-ST. JEOR: SCORE: 1636.74

## 2019-05-30 NOTE — NURSING NOTE
"Return-  Cysto  Hx of BPH  He reports increased increased frq/nocturia and a weaker stream.  Pt denies other urinary sx and pain...    Chief Complaint   Patient presents with     Cystoscopy     BPH       Blood pressure 129/78, pulse 62, height 1.905 m (6' 3\"), weight 77.1 kg (170 lb). Body mass index is 21.25 kg/m .    Patient Active Problem List   Diagnosis     BPH (benign prostatic hyperplasia)     Malignant neoplasm of prostate (H)       No Known Allergies    Current Outpatient Medications   Medication Sig Dispense Refill     multivitamin, therapeutic (THERA-VIT) TABS Take 1 tablet by mouth daily Reported on 2017       Zolpidem Tartrate (AMBIEN PO) Reported on 2017       ciprofloxacin (CIPRO) 500 MG tablet Take one tablet twice daily for 3 days. (Patient not taking: Reported on 2018) 6 tablet 0     diazepam (VALIUM) 10 MG tablet Take 1 tablet (10 mg) by mouth every 6 hours as needed for anxiety or sleep Take 30-60 minutes before procedure.  Do not operate a vehicle after taking this medication. (Patient not taking: Reported on 2017) 1 tablet 0     omeprazole (PRILOSEC) 40 MG capsule Take 40 mg by mouth Reported on 2017       trospium (SANCTURA XR) 60 MG CP24 24 hr capsule Take 60 mg by mouth Reported on 2017       trospium (SANCTURA XR) 60 MG CP24 24 hr capsule Take 1 capsule (60 mg) by mouth every morning (Patient not taking: Reported on 2017) 30 each 3     trospium (SANCTURA XR) 60 MG CP24 Take 1 capsule (60 mg) by mouth every morning (Patient not taking: Reported on 2018) 90 each 0       Social History     Tobacco Use     Smoking status: Never Smoker     Smokeless tobacco: Never Used   Substance Use Topics     Alcohol use: None     Drug use: None       Invasive Procedure Safety Checklist:    Procedure: Cystoscopy    Action: Complete sections and checkboxes as appropriate.    Pre-procedure:  1. Patient ID Verified with 2 identifiers (Estela and  or MRN) : YES    2. " Procedure and site verified with patient/designee (when able) : YES    3. Accurate consent documentation in medical record : YES    4. H&P (or appropriate assessment) documented in medical record : N/A  H&P must be up to 30 days prior to procedure an updated within 24 hours of                 Procedure as applicable.     5. Relevant diagnostic and radiology test results appropriately labeled and displayed as applicable : YES    6. Blood products, implants, devices, and/or special equipment available for the procedure as applicable : YES    7. Procedure site(s) marked with provider initials [Exclusions: none] : NO    8. Marking not required. Reason : Yes  Procedure does not require site marking    Time Out:     Time-Out performed immediately prior to starting procedure, including verbal and active participation of all team members addressing: YES    1. Correct patient identity.  2. Confirmed that the correct side and site are marked.  3. An accurate procedure to be done.  4. Agreement on the procedure to be done.  5. Correct patient position.  6. Relevant images and results are properly labeled and appropriately displayed.  7. The need to administer antibiotics or fluids for irrigation purposes during the procedure as applicable.  8. Safety precautions based on patient history or medication use.    During Procedure: Verification of correct person, site, and procedure occurs any time the responsibility for care of the patient is transferred to another member of the care team.    The following medication was given:     MEDICATION: Lidocaine Uro-Jet 2% 200mg (20mg/mL)  ROUTE: Urethral   SITE: Urethra   DOSE: 10mL  LOT #: [id number]  : [employer]   EXPIRATION DATE: 2-21  NDC#: [address]-34   Was there drug waste? No    Prior to injection, verified patient identity using patient's name and date of birth.  Due to injection administration, patient instructed to remain in clinic for 15 minutes  afterwards, and to  report any adverse reaction to me immediately.    Drug Amount Wasted:  None.  Vial/Syringe: Single dose vial      BREANNE Slater  5/30/2019  4:40 PM

## 2019-05-30 NOTE — PATIENT INSTRUCTIONS
"MRI of prostate. Consult with Dr. Carroll.      It was a pleasure meeting with you today.  Thank you for allowing me and my team the privilege of caring for you today.  YOU are the reason we are here, and I truly hope we provided you with the excellent service you deserve.  Please let us know if there is anything else we can do for you so that we can be sure you are leaving completely satisfied with your care experience.          AFTER YOUR CYSTOSCOPY        You have just completed a cystoscopy, or \"cysto\", which allowed your physician to learn more about your bladder (or to remove a stent placed after surgery). We suggest that you continue to avoid caffeine, fruit juice, and alcohol for the next 24 hours, however, you are encouraged to return to your normal activities.         A few things that are considered normal after your cystoscopy:     * Small amount of bleeding (or spotting) that clears within the next 24 hours     * Slight burning sensation with urination     * Sensation to of needing to avoid more frequently     * The feeling of \"air\" in your urine     * Mild discomfort that is relieved with Tylenol        Please contact our office promptly if you:     * Develop a fever above 101 degrees     * Are unable to urinate     * Develop bright red blood that does not stop     * Severe pain or swelling         Please contact our office with any concerns or questions @DEPTPHN.  "

## 2019-05-30 NOTE — PROGRESS NOTES
Urology Clinic    Darren Hopkins MD  Date of Service: 2019     Name: Sidney Preciado  MRN: [id number]  Age: 68 year old  : 1950  [facility]     Assessment and Plan:  1. Cytoscopy today (2019)     Bilobar hypertrophy with some intravesical protrusion of the prostate with a median lobe. Mild trabeculations.     2. Very low risk prostate cancer diagnosed in 2015  (Sanborn 3 + 3, 2 cores, PSA 2.53) on AS and bothersome LUTS s/p Rezum (2016) with persistent post-procedure LUTS.    He describes urinary symptoms included weak stream, frequency (Q1H), urgency and nocturia (5-6X). He is currently on trospium and dutasteride.     The options for symptom relief would be a TURP or HoLEP.  I would recommend HoLEP if the patient were to choose another surgical operation. We discussed this may be the most definitive solution for this issue. Dr. Carroll is a specialist in this area. I noted there is a risk for retrograde ejaculation but this would not interfere with the patient's ejaculation sensation. We discussed blood transfusion as the biggest risk, though this is a low probability     --I will refer to Dr. Carroll for a possible HoLEP  --MRI of prostate  --UA today    Attestation:  This patient was seen and evaluated by me, with a scribe taking notes.  I have reviewed the note above and agree.  The physical exam and or any procedures were performed by me and the pertinant details are outlined below.       Darren Hopkins MD  Department of Urology  [facility]    ______________________________________________________________________    The patient previously followed with Dr. Colorado with recurrent cytoscopy procedures.     HPI  Sidney Preciado is a 68 year old male with a urologic history of very low risk prostate cancer diagnosed in 2015  (Sanborn 3 + 3, 2 cores, PSA 2.53) on AS and bothersome LUTS s/p Rezum (2016) with  persistent post-procedure LUTS. Here today for cysto.    The patient states that he was better with the Rezum for a little while, but this urination symptoms have returned to baseline. The patient describes incomplete emptying. The patient denies hematuria.      Initial urinary symptoms included weak stream, frequency (Q1H), urgency and nocturia (5-6X).  He describes 4-5 episodes of nocturia.  He is currently on trospium and dutasteride.     CYSTOSCOPY PROCEDURE:   After sterile preparation and draping of the patient,  a 16-[place] flexible cystoscope was introduced via the urethra.  It was passed without difficulty into the bladder.  The urethra was open without evidence of stricture.  The ureteral orifices were orthotopic.  The bladder mucosa was evaluated using both antegrade and retroflex views and showed no evidence of any lesions.  I did not observe bladder stones.  The prostate was quite enlarged. Bilobar hypertrophy with some intravesical protrusion of the prostate with a median lobe. Mild trabeculations.     Today, he denies recent fevers, chills, dysuria, hematuria, or flanks pain. He endorses      Review of Systems:   Pertinent items are noted in HPI or as below, remainder of complete ROS is negative.      Laboratory:   I reviewed all applicable laboratory and pathology data and went over findings with patient  Significant for     PSA 05/21/2018: 3  PSA   Date Value Ref Range Status   04/26/2017 3.02 0 - 4 ug/L Final     Comment:     Assay Method:  Chemiluminescence using Siemens Vista analyzer     Imaging:   I personally reviewed all applicable imaging and went over the below findings with patient.    Results for orders placed or performed in visit on 08/25/15   MR Pelvis w/o & w Contrast    Addendum: 9/10/2015    Using an image analysis software package ([employer]), three-dimensional  (3D) volumetric images of the prostate were reconstructed by a  radiologist and archived to PACS for prostate lesion  analysis and  biopsy planning.  Additionally, the software package was utilized for  contrast kinetic analysis.    POOJA GARCIA MD      Narrative    MRI PROSTATE:  8/25/2015 10:30 AM    CLINICAL HISTORY: Hypertrophy of prostate without urinary obstruction  and other lower urinary tract symptoms (LUTS)    Comparison: None.    TECHNIQUE:     The following sequences were obtained: High-resolution axial  T2-weighted, coronal T2-weighted, 3D volumetric T2-weighted, axial  pre-contrast T1, axial diffusion-weighted, axial apparent diffusion  coefficient and axial dynamic contrast-enhanced T1. Postcontrast  images were evaluated on a separate workstation to evaluate dynamic  contrast enhancement.  Contrast dose: 7.5 mL of gadavist    The images are interpreted according to PI-RADS v.2    FINDINGS:  Prostate gland size: 6.8 x 4.2 x 5.3 cm  Volume: 79 cc     Peripheral zone: There is a small 5 mm x 4 mm focus of decreased T2  signal in the left mid gland towards the base at the 5:00 position as  seen on series 23 image 55 and series 20 image 12. This does appear to  restricted diffusion with decreased ADC and increased signal on high  B. value imaging. It does not have clear early contrast enhancement    PI-RADS:  Peripheral zone T2: 3  Diffusion-weighted image: 4    Contrast-enhanced images: Negative      Overall score: 4    Transitional zone: There are BPH type changes without suspicious  lesion.    PI-RADS:  Transition zone T2: 2  Diffusion-weighted image: 2  Contrast-enhanced images: Negative.      Overall score: 2    Remainder of the pelvis: Limited field-of-view with without imaging of  the entire pelvis. No definite lymphadenopathy. Bladder is  unremarkable. No suspicious bone lesions.      Impression    IMPRESSION:   1. Enlarged prostate with BPH type changes. There is a 5 mm lesion in  the left peripheral zone towards the base which is characterized as  PIRADS 4 - High probability.  Clinically significant cancer  is likely  to be present.  Subjectively, this lesion is indeterminate (Likert 3)  however biopsy should be strongly considered.  2. No evidence of extraprostatic malignancy. No suspicious adenopathy  or evidence of pelvic metastases.    The images are interpreted according to PI-RADS v2.  [email or web address]  [email or web address]    I have personally reviewed the examination and initial interpretation  and I agree with the findings.    TRISTEN MANUEL MD     Scribe Disclosure:  I, Jhoan Ignacio, am serving as a scribe to document services personally performed by Darren Hopkins MD at this visit, based upon the provider's statements to me. All documentation has been reviewed by the aforementioned provider prior to being entered into the official medical record.     CC  [facility] Care Team:  [facility]  Confirmed, No Pcp  Padmini Sarah MD as MD (Vascular and Interventional Radiology)  Rolly Colorado MD as MD (Urology)  Padmini Sarah MD as Referring Physician (Vascular and Interventional Radiology)  [facility]    Copy to patient  JEANIE JOHNSTON  [address] [facility] [address]    Answers for HPI/ROS submitted by the patient on 5/30/2019   If you checked off any problems, how difficult have these problems made it for you to do your work, take care of things at home, or get along with other people?: Not difficult at all  PHQ9 TOTAL SCORE: 3

## 2019-05-30 NOTE — LETTER
2019       RE: Sidney Preciado  [address] [facility] [address]     Dear Colleague,    Thank you for referring your patient, Sidney Preciado, to the [facility] UROLOGY AND [facility] FOR PROSTATE AND UROLOGIC CANCERS at [facility]. Please see a copy of my visit note below.      Urology Clinic    Darren Hopkins MD  Date of Service: 2019     Name: Sidney Preciado  MRN: [id number]  Age: 68 year old  : 1950  Referring provider: Josselyn Ramirez [facility]     Assessment and Plan:  1. Cytoscopy today (2019)     Bilobar hypertrophy with some intravesical protrusion of the prostate with a median lobe. Mild trabeculations.     2. Very low risk prostate cancer diagnosed in 2015  ([place] 3 + 3, 2 cores, PSA 2.53) on AS and bothersome LUTS s/p Rezum (2016) with persistent post-procedure LUTS.    He describes urinary symptoms included weak stream, frequency (Q1H), urgency and nocturia (5-6X). He is currently on trospium and dutasteride.     The options for symptom relief would be a TURP or HoLEP.  I would recommend HoLEP if the patient were to choose another surgical operation. We discussed this may be the most definitive solution for this issue. Dr. Carroll is a specialist in this area. I noted there is a risk for retrograde ejaculation but this would not interfere with the patient's ejaculation sensation. We discussed blood transfusion as the biggest risk, though this is a low probability     --I will refer to Dr. Carroll for a possible HoLEP  --MRI of prostate  --UA today    Attestation:  This patient was seen and evaluated by me, with a scribe taking notes.  I have reviewed the note above and agree.  The physical exam and or any procedures were performed by me and the pertinant details are outlined below.       Darren Hopkins MD  Department of Urology  [facility]  [place]    ______________________________________________________________________    The patient previously followed with Dr. Colorado with recurrent cytoscopy procedures.     HPI  Sidney Preciado is a 68 year old male with a urologic history of very low risk prostate cancer diagnosed in September 2015  ([place] 3 + 3, 2 cores, PSA 2.53) on AS and bothersome LUTS s/p Rezum (9/2016) with persistent post-procedure LUTS. Here today for cysto.    The patient states that he was better with the Rezum for a little while, but this urination symptoms have returned to baseline. The patient describes incomplete emptying. The patient denies hematuria.      Initial urinary symptoms included weak stream, frequency (Q1H), urgency and nocturia (5-6X).   He describes 4-5 episodes of nocturia.  He is currently on trospium and dutasteride.     CYSTOSCOPY PROCEDURE:   After sterile preparation and draping of the patient,  a 16-Polish flexible cystoscope was introduced via the urethra.  It was passed without difficulty into the bladder.  The urethra was open without evidence of stricture.  The ureteral orifices were orthotopic.  The bladder mucosa was evaluated using both antegrade and retroflex views and showed no evidence of any lesions.  I did not observe bladder stones.  The prostate was quite enlarged. Bilobar hypertrophy with some intravesical protrusion of the prostate with a median lobe. Mild trabeculations.     Today, he denies recent fevers, chills, dysuria, hematuria, or flanks pain. He endorses      Review of Systems:   Pertinent items are noted in HPI or as below, remainder of complete ROS is negative.      Laboratory:   I reviewed all applicable laboratory and pathology data and went over findings with patient  Significant for     PSA 05/21/2018: 3  PSA   Date Value Ref Range Status   04/26/2017 3.02 0 - 4 ug/L Final     Comment:     Assay Method:  Chemiluminescence using Siemens Vista analyzer     Imaging:   I personally  reviewed all applicable imaging and went over the below findings with patient.    Results for orders placed or performed in visit on 08/25/15   MR Pelvis w/o & w Contrast    Addendum: 9/10/2015    Using an image analysis software package (Etherios), three-dimensional  (3D) volumetric images of the prostate were reconstructed by a  radiologist and archived to PACS for prostate lesion analysis and  biopsy planning.  Additionally, the software package was utilized for  contrast kinetic analysis.    POOJA GARCIA MD      Narrative    MRI PROSTATE:  8/25/2015 10:30 AM    CLINICAL HISTORY: Hypertrophy of prostate without urinary obstruction  and other lower urinary tract symptoms (LUTS)    Comparison: None.    TECHNIQUE:     The following sequences were obtained: High-resolution axial  T2-weighted, coronal T2-weighted, 3D volumetric T2-weighted, axial  pre-contrast T1, axial diffusion-weighted, axial apparent diffusion  coefficient and axial dynamic contrast-enhanced T1. Postcontrast  images were evaluated on a separate workstation to evaluate dynamic  contrast enhancement.  Contrast dose: 7.5 mL of gadavist    The images are interpreted according to PI-RADS v.2    FINDINGS:  Prostate gland size: 6.8 x 4.2 x 5.3 cm  Volume: 79 cc     Peripheral zone: There is a small 5 mm x 4 mm focus of decreased T2  signal in the left mid gland towards the base at the 5:00 position as  seen on series 23 image 55 and series 20 image 12. This does appear to  restricted diffusion with decreased ADC and increased signal on high  B. value imaging. It does not have clear early contrast enhancement    PI-RADS:  Peripheral zone T2: 3  Diffusion-weighted image: 4    Contrast-enhanced images: Negative      Overall score: 4    Transitional zone: There are BPH type changes without suspicious  lesion.    PI-RADS:  Transition zone T2: 2  Diffusion-weighted image: 2  Contrast-enhanced images: Negative.      Overall score: 2    Remainder of the  pelvis: Limited field-of-view with without imaging of  the entire pelvis. No definite lymphadenopathy. Bladder is  unremarkable. No suspicious bone lesions.      Impression    IMPRESSION:   1. Enlarged prostate with BPH type changes. There is a 5 mm lesion in  the left peripheral zone towards the base which is characterized as  PIRADS 4 - High probability.  Clinically significant cancer is likely  to be present.  Subjectively, this lesion is indeterminate (Likert 3)  however biopsy should be strongly considered.  2. No evidence of extraprostatic malignancy. No suspicious adenopathy  or evidence of pelvic metastases.    The images are interpreted according to PI-RADS v2.  [email or web address].[email or web address]~/[email or web address]  [email or web address]    I have personally reviewed the examination and initial interpretation  and I agree with the findings.    TRISTEN MANUEL MD     Scribe Disclosure:  I, Jhoan Ignacio, am serving as a scribe to document services personally performed by Darren Hopkins MD at this visit, based upon the provider's statements to me. All documentation has been reviewed by the aforementioned provider prior to being entered into the official medical record.     CC  Patient Care Team:  Balta, Josselyn Ramirez as PCP - General  Confirmed, No Pcp  Padmini Sarah MD as MD (Vascular and Interventional Radiology)  Rolly Colorado MD as MD (Urology)  Padmini Sarah MD as Referring Physician (Vascular and Interventional Radiology)      Copy to patient  JEANIE DOUGHERTY [address] [address]    Answers for HPI/ROS submitted by the patient on 5/30/2019   If you checked off any problems, how difficult have these problems made it for you to do your work, take care of things at home, or get along with other people?: Not difficult at all  PHQ9 TOTAL SCORE: 3      Again, thank you for allowing me to participate in the care of your patient.       Sincerely,    Darren Hopkins MD

## 2019-05-31 ENCOUNTER — HOSPITAL ENCOUNTER (OUTPATIENT)
Dept: MRI IMAGING | Facility: CLINIC | Age: 69
Discharge: HOME | End: 2019-05-31
Attending: UROLOGY | Admitting: UROLOGY
Payer: COMMERCIAL

## 2019-05-31 DIAGNOSIS — N13.8 BENIGN PROSTATIC HYPERPLASIA WITH URINARY OBSTRUCTION: ICD-10-CM

## 2019-05-31 DIAGNOSIS — N40.1 BENIGN PROSTATIC HYPERPLASIA WITH URINARY OBSTRUCTION: ICD-10-CM

## 2019-05-31 PROCEDURE — A9585 GADOBUTROL INJECTION: HCPCS | Performed by: UROLOGY

## 2019-05-31 PROCEDURE — 72197 MRI PELVIS W/O & W/DYE: CPT

## 2019-05-31 PROCEDURE — 25500064 ZZH RX 255 OP 636: Performed by: UROLOGY

## 2019-05-31 PROCEDURE — 25000128 H RX IP 250 OP 636: Performed by: UROLOGY

## 2019-05-31 RX ORDER — GADOBUTROL 604.72 MG/ML
7.5 INJECTION INTRAVENOUS ONCE
Status: COMPLETED | OUTPATIENT
Start: 2019-05-31 | End: 2019-05-31

## 2019-05-31 RX ORDER — GADOBUTROL 604.72 MG/ML
0.1 INJECTION INTRAVENOUS ONCE
Status: DISCONTINUED | OUTPATIENT
Start: 2019-05-31 | End: 2019-06-01 | Stop reason: HOSPADM

## 2019-05-31 RX ADMIN — GADOBUTROL 7.5 ML: 604.72 INJECTION INTRAVENOUS at 17:14

## 2019-05-31 RX ADMIN — SODIUM CHLORIDE 100 ML: 9 INJECTION, SOLUTION INTRAVENOUS at 16:58

## 2019-05-31 ASSESSMENT — PATIENT HEALTH QUESTIONNAIRE - PHQ9: SUM OF ALL RESPONSES TO PHQ QUESTIONS 1-9: 3

## 2019-06-18 ENCOUNTER — PRE VISIT (OUTPATIENT)
Dept: UROLOGY | Facility: CLINIC | Age: 69
End: 2019-06-18

## 2019-06-18 ENCOUNTER — OFFICE VISIT (OUTPATIENT)
Dept: UROLOGY | Facility: CLINIC | Age: 69
End: 2019-06-18
Payer: COMMERCIAL

## 2019-06-18 ENCOUNTER — ALLIED HEALTH/NURSE VISIT (OUTPATIENT)
Dept: UROLOGY | Facility: CLINIC | Age: 69
End: 2019-06-18
Payer: COMMERCIAL

## 2019-06-18 VITALS
WEIGHT: 180 LBS | SYSTOLIC BLOOD PRESSURE: 115 MMHG | BODY MASS INDEX: 22.38 KG/M2 | HEIGHT: 75 IN | HEART RATE: 62 BPM | DIASTOLIC BLOOD PRESSURE: 78 MMHG

## 2019-06-18 DIAGNOSIS — N13.8 BENIGN PROSTATIC HYPERPLASIA WITH URINARY OBSTRUCTION: ICD-10-CM

## 2019-06-18 DIAGNOSIS — N40.1 BENIGN PROSTATIC HYPERPLASIA WITH URINARY OBSTRUCTION: Primary | ICD-10-CM

## 2019-06-18 DIAGNOSIS — N40.1 BENIGN PROSTATIC HYPERPLASIA WITH URINARY OBSTRUCTION: ICD-10-CM

## 2019-06-18 DIAGNOSIS — N13.8 BENIGN PROSTATIC HYPERPLASIA WITH URINARY OBSTRUCTION: Primary | ICD-10-CM

## 2019-06-18 LAB
ALBUMIN UR-MCNC: NEGATIVE MG/DL
APPEARANCE UR: CLEAR
APPEARANCE UR: CLEAR
BACTERIA #/AREA URNS HPF: ABNORMAL /HPF
BILIRUB UR QL STRIP: NEGATIVE
BILIRUB UR QL: NORMAL
COLOR UR AUTO: YELLOW
COLOR UR: YELLOW
GLUCOSE UR STRIP-MCNC: NEGATIVE MG/DL
GLUCOSE URINE: NORMAL MG/DL
HGB UR QL STRIP: NEGATIVE
HGB UR QL: NORMAL
KETONES UR QL: NORMAL MG/DL
KETONES UR STRIP-MCNC: NEGATIVE MG/DL
LEUKOCYTE ESTERASE UR QL STRIP: NEGATIVE
LEUKOCYTE ESTERASE URINE: NORMAL
MUCOUS THREADS #/AREA URNS LPF: PRESENT /LPF
NITRATE UR QL: NEGATIVE
NITRITE UR QL STRIP: NORMAL
PH UR STRIP: 6 PH (ref 5–7)
PH UR STRIP: 6.5 PH (ref 5–7)
PROTEIN ALBUMIN URINE: NORMAL MG/DL
RBC #/AREA URNS AUTO: 2 /HPF (ref 0–2)
SOURCE: ABNORMAL
SOURCE: NORMAL
SP GR UR STRIP: 1.01 (ref 1–1.03)
SP GR UR STRIP: 1.01 (ref 1–1.03)
SPERM #/AREA URNS HPF: PRESENT /HPF
UROBILINOGEN UR QL STRIP: 0.2 EU/DL (ref 0.2–1)
UROBILINOGEN UR STRIP-MCNC: 0 MG/DL (ref 0–2)
WBC #/AREA URNS AUTO: <1 /HPF (ref 0–5)

## 2019-06-18 RX ORDER — OXYBUTYNIN CHLORIDE 5 MG/1
5 TABLET, EXTENDED RELEASE ORAL DAILY
Qty: 30 TABLET | Refills: 1 | Status: SHIPPED | OUTPATIENT
Start: 2019-06-18 | End: 2019-07-11

## 2019-06-18 RX ORDER — TAMSULOSIN HYDROCHLORIDE 0.4 MG/1
0.4 CAPSULE ORAL DAILY
Qty: 30 CAPSULE | Refills: 3 | Status: SHIPPED | OUTPATIENT
Start: 2019-06-18

## 2019-06-18 ASSESSMENT — PAIN SCALES - GENERAL: PAINLEVEL: NO PAIN (0)

## 2019-06-18 ASSESSMENT — MIFFLIN-ST. JEOR: SCORE: 1677.1

## 2019-06-18 NOTE — NURSING NOTE
"Return-BPH/HoLep Convo    He complains most of the Nocturia getting up 4-5 times per night.      Chief Complaint   Patient presents with     RECHECK     BPH/HoLep       Blood pressure 115/78, pulse 62, height 1.905 m (6' 3\"), weight 81.6 kg (180 lb). Body mass index is 22.5 kg/m .    Patient Active Problem List   Diagnosis     BPH (benign prostatic hyperplasia)     Malignant neoplasm of prostate (H)       No Known Allergies    Current Outpatient Medications   Medication Sig Dispense Refill     multivitamin, therapeutic (THERA-VIT) TABS Take 1 tablet by mouth daily Reported on 4/26/2017       trospium (SANCTURA XR) 60 MG CP24 24 hr capsule Take 60 mg by mouth Reported on 4/26/2017       Zolpidem Tartrate (AMBIEN PO) Reported on 4/26/2017       ciprofloxacin (CIPRO) 500 MG tablet Take one tablet twice daily for 3 days. (Patient not taking: Reported on 7/18/2018) 6 tablet 0     diazepam (VALIUM) 10 MG tablet Take 1 tablet (10 mg) by mouth every 6 hours as needed for anxiety or sleep Take 30-60 minutes before procedure.  Do not operate a vehicle after taking this medication. (Patient not taking: Reported on 4/26/2017) 1 tablet 0     omeprazole (PRILOSEC) 40 MG capsule Take 40 mg by mouth Reported on 4/26/2017       trospium (SANCTURA XR) 60 MG CP24 24 hr capsule Take 1 capsule (60 mg) by mouth every morning (Patient not taking: Reported on 4/26/2017) 30 each 3     trospium (SANCTURA XR) 60 MG CP24 Take 1 capsule (60 mg) by mouth every morning (Patient not taking: Reported on 7/18/2018) 90 each 0       Social History     Tobacco Use     Smoking status: Never Smoker     Smokeless tobacco: Never Used   Substance Use Topics     Alcohol use: None     Drug use: None       Moe Aguirre, EMT  6/18/2019  1:10 PM        "

## 2019-06-18 NOTE — PROGRESS NOTES
Urology Clinic    Sidney Carroll MD  Date of Service: 2019     Name: Sidney Preciado  MRN: [id number]  Age: 69 year old  : 1950  Referring provider: Josselyn Gifford     Assessment and Plan:  Assessment:  Sidney Preciado  is a 69 year old male with progressive LUTS refractory to Rezum, also with low grade prostate cancer on surveillance.       Plan:  After discussion of medical and surgical treatments for BPH including alpha blockers, anticholinergics, transurethral resection, laser ablation, enucleation and simple prostatectomy, Mr. Preciado has decided to proceed with Holmium Laser Enucleation of the Prostate (HoLEP).  He agrees to retry medical therapy for several weeks prior to see if this is satisfactory but prefers to avoid medications if possible.      We discussed the associated risks of this procedure included but not limited to the following:  -Bleeding, potentially significant enough to require clot evacuation and blood transfusion  -Infection, for which we will plan to treat preoperatively based on targeted antibiotic therapy  -Damage to the bladder, urethra and penis including the risk of urethral stricture and bladder neck contracture  -Risk of incidentally discovered more aggressive  prostate cancer.  We discussed that this would not preclude him from further therapy though it could prolong recovery and potentially increase risk of complications associated with cancer treatment.  Further preoperative workup to assess for prostate cancer prior to surgery was offered but patient deferred.  -Risk of retrograde ejaculation which would be expected to occur in the majority if not all men after HoLEP  -Risk of urinary incontinence.  We discussed that in the majority of men this is a transient process that is generally self limited to the first 6-12 weeks after surgery though could take longer to resolve depending on baseline bladder instability.  -Risk of postperative urinary retention  though in published series HoLEP has been found to be associated with high success rates of achieving spontaneous voiding even in men with underactive and atonic bladders.  -We will proceed with preoperative clearance with preference to minimize all anticoagulation as deemed acceptable by his primary care provider.       ______________________________________________________________________    HPI  Sidney Preciado is a 69 year old male with a urologic history of very low risk prostate cancer diagnosed in September 2015  ([place] 3 + 3, 2 cores, PSA 2.53) on AS and bothersome LUTS s/p Rezum (9/2016) with persistent post-procedure LUTS. He is currently on trospium and dutasteride.  PSAstable at 3 for several years.     He had temporary improvement with Rezum but urinary symptoms have returned, including sensation of incomplete emptying, weak stream, frequency (Q1H), urgency, and nocturia (5-6 times).    He now is struggling with feelings of heaviness in his bladder, nocturia 4+ times per night, and a weaker stream. He states he does not void very much when he feels the urge to go. He does describe some symptoms of detrusor overactivity, such as urgency and tingling. Last drink is around 9PM, goes to sleep at 12AM. No hematuria or UTIs. He does feel like he empties his bladder, and his PVR is 0 today.  He is not on any medications today for his LUTS, but is interested in trying something today.     Cystoscopy per Dr. Hopkins on 05/30/2019 showed bilobar hypertrophy with some intravesical protrusion of the prostate with a median lobe and mild trabeculations. He presents today for consideration of HoLEP.  He is looking for a more definitive treatment for his BPH understanding that formal resection and/or enucleation is more likley to address BPH symptoms.  He is not interested in prostatectomy or treatment for prostate cancer at this time.  Prefers to stay on surveillance if possible.    Underwent a prostate MRI several  weeks ago the results of which showed no PiRADS 3 or greater lesions and a prosatte size of 60 gm.    Review of Systems:   Pertinent items are noted in HPI or as below, remainder of complete ROS is negative.      Physical Exam:   Patient is a 69 year old  male   Vitals: There were no vitals taken for this visit.  General Appearance Adult: Alert, no acute distress, oriented  HENT: throat/mouth:normal, good dentition  Neck: No adenopathy,masses or thyromegaly  Lungs: no respiratory distress, or pursed lip breathing  Heart: No obvious jugular venous distension present  Abdomen: soft, nontender, no organomegaly or masses, There is no height or weight on file to calculate BMI.  Lymphatics: No cervical or supraclavicular adenopathy  Musculoskeltal: extremities normal, no peripheral edema  Skin: no suspicious lesions or rashes  Neuro: Alert, oriented, speech and mentation normal  Psych: affect and mood normal  Gait: Normal    Laboratory:   I personally reviewed all applicable laboratory data and went over findings with patient  Significant for:    UA RESULTS:   Recent Labs   Lab Test 05/30/19  1650   SG 1.010   URINEPH 7.0   NITRITE Negative   RBCU <1   WBCU 1       PSA RESULTS  PSA   Date Value Ref Range Status   04/26/2017 3.02 0 - 4 ug/L Final     Comment:     Assay Method:  Chemiluminescence using Siemens Vista analyzer     Imaging:   I personally reviewed all applicable imaging and went over the below findings with patient.    Results for orders placed or performed during the hospital encounter of 05/31/19   MR Prostate wo & w Contrast    Narrative    MRI PROSTATE: 5/31/2019 4:00 PM    CLINICAL HISTORY: prostate cancer; Benign prostatic hyperplasia with  urinary obstruction; Benign prostatic hyperplasia with urinary  obstruction     Most Recent PSA: 3.02 ug/L    Comparison: MRI 8/25/2015.    TECHNIQUE:  The following sequences were obtained: High-resolution axial  T2-weighted, coronal T2-weighted, 3D volumetric  T2-weighted, axial  pre-contrast T1, axial diffusion-weighted, axial apparent diffusion  coefficient and axial dynamic contrast-enhanced T1. Postcontrast  images were evaluated on a separate workstation to evaluate dynamic  contrast enhancement. The technique of this exam is PI-RADS v2.1  compliant. Contrast dose: 7.5mL Gadavist    FINDINGS:  Size: 59 grams  Hemorrhage: Mild  Peripheral zone: Heterogeneous on T2-weighted images. Regions of  mildly decreased signal on ADC or DWI which are best characterized as  PI-RADS 2 without highly suspicious lesion.  Transition zone: Enlarged with BPH changes. Transition zone nodules  which are circumscribed or mostly encapsulated without diffusion  restriction.  PI-RADS 2.  No highly suspicious nodules.    Neurovascular bundles: No suspicion of involvement by malignancy  Seminal vesicles: Not involved by tumor. No focal abnormality.  Lymph nodes: No adenopathy.  Bones: No suspicious lesions. Mild degenerative changes of the lower  lumbar spine.  Other pelvic organs: Sigmoid diverticulosis. Bladder wall thickening  and trabeculation are probably sequelae of chronic bladder outlet  narrowing.        Impression    IMPRESSION: Based on the most suspicious abnormality, this exam is  characterized as PIRADS 2 - Clinically significant cancer is unlikely  to be present.      PIRADS? v2.1 Assessment Categories   PIRADS 1: Very low (clinically significant cancer is highly unlikely  to be present)   PIRADS 2: Low (clinically significant cancer is unlikely to be  present)   PIRADS 3: Intermediate (the presence of clinically significant cancer  is equivocal)   PIRADS 4: High (clinically significant cancer is likely to be present)    PIRADS 5: Very high (clinically significant cancer is highly likely to  be present)    I have personally reviewed the examination and initial interpretation  and I agree with the findings.    MD Immanuel LR Disclosure:  Valerie RUANO a  gaviotae, prepared the chart for today's encounter.

## 2019-06-18 NOTE — PROGRESS NOTES
Mr. Preciado is a 68 y/o M w/ h/o BPH s/p Rezum in 2016. The procedure initially worked well to control his LUTS, and he was able to discontinue his medications. Notably, he had a difficult recovery with inability to pass his void trial several times. He has since gradually declined in urinary fx. He now is struggling with feelings of heaviness in his bladder, nocturia 4+ times per night, and a weaker stream. He states he does not void very much when he feels the urge to go. He does describe some symptoms of detrusor overactivity, such as urgency and tingling. Last drink is around 9PM, goes to sleep at 12AM. No hematuria or UTIs.   He does feel like he empties his bladder, and his PVR is 0 today.   He is not on any medications today for his LUTS, but is interested in trying something today.

## 2019-06-18 NOTE — LETTER
2019       RE: Sidney Preciado  [address] [facility] [address]     Dear Colleague,    Thank you for referring your patient, Sidney Preciado, to the [facility] UROLOGY AND [facility] FOR PROSTATE AND UROLOGIC CANCERS at [facility]. Please see a copy of my visit note below.      Urology Clinic    Sidney Carroll MD  Date of Service: 2019     Name: Sidney Preciado  MRN: [id number]  Age: 69 year old  : 1950  Referring provider: [facility]oss[facility] Ramirez [facility]     Assessment and Plan:  Assessment:  Sidney Preciado  is a 69 year old male with progressive LUTS refractory to Rezum, also with low grade prostate cancer on surveillance.       Plan:  After discussion of medical and surgical treatments for BPH including alpha blockers, anticholinergics, transurethral resection, laser ablation, enucleation and simple prostatectomy, Mr. Preciado has decided to proceed with Holmium Laser Enucleation of the Prostate (HoLEP).  He agrees to retry medical therapy for several weeks prior to see if this is satisfactory but prefers to avoid medications if possible.      We discussed the associated risks of this procedure included but not limited to the following:  -Bleeding, potentially significant enough to require clot evacuation and blood transfusion  -Infection, for which we will plan to treat preoperatively based on targeted antibiotic therapy  -Damage to the bladder, urethra and penis including the risk of urethral stricture and bladder neck contracture  -Risk of incidentally discovered more aggressive  prostate cancer.  We discussed that this would not preclude him from further therapy though it could prolong recovery and potentially increase risk of complications associated with cancer treatment.  Further preoperative workup to assess for prostate cancer prior to surgery was offered but patient deferred.  -Risk of retrograde ejaculation which would be expected to occur in the majority if  not all men after HoLEP  -Risk of urinary incontinence.  We discussed that in the majority of men this is a transient process that is generally self limited to the first 6-12 weeks after surgery though could take longer to resolve depending on baseline bladder instability.  -Risk of postperative urinary retention though in published series HoLEP has been found to be associated with high success rates of achieving spontaneous voiding even in men with underactive and atonic bladders.  -We will proceed with preoperative clearance with preference to minimize all anticoagulation as deemed acceptable by his primary care provider.       ______________________________________________________________________    HPI  Sidney Preciado is a 69 year old male with a urologic history of very low risk prostate cancer diagnosed in September 2015  (Monson 3 + 3, 2 cores, PSA 2.53) on AS and bothersome LUTS s/p Rezum (9/2016) with persistent post-procedure LUTS. He is currently on trospium and dutasteride.  PSAstable at 3 for several years.     He had temporary improvement with Rezum but urinary symptoms have returned, including sensation of incomplete emptying, weak stream, frequency (Q1H), urgency, and nocturia (5-6 times).    He now is struggling with feelings of heaviness in his bladder, nocturia 4+ times per night, and a weaker stream. He states he does not void very much when he feels the urge to go. He does describe some symptoms of detrusor overactivity, such as urgency and tingling. Last drink is around 9PM, goes to sleep at 12AM. No hematuria or UTIs. He does feel like he empties his bladder, and his PVR is 0 today.  He is not on any medications today for his LUTS, but is interested in trying something today.     Cystoscopy per Dr. Hopkins on 05/30/2019 showed bilobar hypertrophy with some intravesical protrusion of the prostate with a median lobe and mild trabeculations.  He presents today for consideration of HoLEP.  He is  looking for a more definitive treatment for his BPH understanding that formal resection and/or enucleation is more likley to address BPH symptoms.  He is not interested in prostatectomy or treatment for prostate cancer at this time.  Prefers to stay on surveillance if possible.    Underwent a prostate MRI several weeks ago the results of which showed no PiRADS 3 or greater lesions and a prosatte size of 60 gm.    Review of Systems:   Pertinent items are noted in HPI or as below, remainder of complete ROS is negative.      Physical Exam:   Patient is a 69 year old  male   Vitals: There were no vitals taken for this visit.  General Appearance Adult: Alert, no acute distress, oriented  HENT: throat/mouth:normal, good dentition  Neck: No adenopathy,masses or thyromegaly  Lungs: no respiratory distress, or pursed lip breathing  Heart: No obvious jugular venous distension present  Abdomen: soft, nontender, no organomegaly or masses, There is no height or weight on file to calculate BMI.  Lymphatics: No cervical or supraclavicular adenopathy  Musculoskeltal: extremities normal, no peripheral edema  Skin: no suspicious lesions or rashes  Neuro: Alert, oriented, speech and mentation normal  Psych: affect and mood normal  Gait: Normal    Laboratory:   I personally reviewed all applicable laboratory data and went over findings with patient  Significant for:    UA RESULTS:   Recent Labs   Lab Test 05/30/19  1650   SG 1.010   URINEPH 7.0   NITRITE Negative   RBCU <1   WBCU 1       PSA RESULTS  PSA   Date Value Ref Range Status   04/26/2017 3.02 0 - 4 ug/L Final     Comment:     Assay Method:  Chemiluminescence using Siemens Vista analyzer     Imaging:   I personally reviewed all applicable imaging and went over the below findings with patient.    Results for orders placed or performed during the hospital encounter of 05/31/19   MR Prostate wo & w Contrast    Narrative    MRI PROSTATE: 5/31/2019 4:00 PM    CLINICAL HISTORY:  prostate cancer; Benign prostatic hyperplasia with  urinary obstruction; Benign prostatic hyperplasia with urinary  obstruction     Most Recent PSA: 3.02 ug/L    Comparison: MRI 8/25/2015.    TECHNIQUE:  The following sequences were obtained: High-resolution axial  T2-weighted, coronal T2-weighted, 3D volumetric T2-weighted, axial  pre-contrast T1, axial diffusion-weighted, axial apparent diffusion  coefficient and axial dynamic contrast-enhanced T1. Postcontrast  images were evaluated on a separate workstation to evaluate dynamic  contrast enhancement. The technique of this exam is PI-RADS v2.1  compliant. Contrast dose: 7.5mL Gadavist    FINDINGS:  Size: 59 grams  Hemorrhage: Mild  Peripheral zone: Heterogeneous on T2-weighted images. Regions of  mildly decreased signal on ADC or DWI which are best characterized as  PI-RADS 2 without highly suspicious lesion.  Transition zone: Enlarged with BPH changes. Transition zone nodules  which are circumscribed or mostly encapsulated without diffusion  restriction.  PI-RADS 2.  No highly suspicious nodules.    Neurovascular bundles: No suspicion of involvement by malignancy  Seminal vesicles: Not involved by tumor. No focal abnormality.  Lymph nodes: No adenopathy.  Bones: No suspicious lesions. Mild degenerative changes of the lower  lumbar spine.  Other pelvic organs: Sigmoid diverticulosis. Bladder wall thickening  and trabeculation are probably sequelae of chronic bladder outlet  narrowing.        Impression    IMPRESSION: Based on the most suspicious abnormality, this exam is  characterized as PIRADS 2 - Clinically significant cancer is unlikely  to be present.      PIRADS? v2.1 Assessment Categories   PIRADS 1: Very low (clinically significant cancer is highly unlikely  to be present)   PIRADS 2: Low (clinically significant cancer is unlikely to be  present)   PIRADS 3: Intermediate (the presence of clinically significant cancer  is equivocal)   PIRADS 4: High  (clinically significant cancer is likely to be present)    PIRADS 5: Very high (clinically significant cancer is highly likely to  be present)    I have personally reviewed the examination and initial interpretation  and I agree with the findings.    KATINA OLSON MD     Scribe Disclosure:  I, Valerie Sanabria, a scribe, prepared the chart for today's encounter.       Again, thank you for allowing me to participate in the care of your patient.      Sincerely,    Sidney Carroll MD

## 2019-06-18 NOTE — PROGRESS NOTES
Pre Op Teaching Flowsheet       Pre and Post op Patient Education  Relevant Diagnosis:  BPH      Motivation Level:  Asks Questions: Yes  Eager to Learn:  Yes  Cooperative: Yes  Receptive (willing/able to accept information):  Yes  Patient demonstrates understanding of the following:  Date and time of surgery: TBD  Location of surgery: TBD  History and Physical and any other testing necessary prior to surgery: Yes  Required time line for completion of History and Physical and any pre-op testing: Yes having outside [facility]  NPO Guidelines: Nothing to eat 8 hours prior to surgery. Can have clear liquids up to 2 hours prior to sugery    Patient demonstrates understanding of the following:  Pre-op bowel prep: N/A  Pre-op showering/scrub information with Hibiclens Soap: Yes  Medications to take the day of surgery:  Per PCP  Blood thinner medications discussed and when to stop (if applicable):  Yes  Diabetes medication management (if applicable):  Patient to discuss with Primary Care Provider  Discussed pain control after surgery: pain scale, pain medications and pain management techniques  Infection Prevention: Patient demonstrates understanding of the following:  Patient instructed on hand hygiene:  Yes  Surgical procedure site care taught: N/A  Signs and symptoms of infection taught:  Yes  Wound care will be taught at the time of discharge.  Central venous catheter care will be taught at the time of discharge (if applicable).    Post-op follow-up:  Discussed how to contact the hospital, nurse, and clinic scheduling staff if necessary.    Instructional materials used/given/mailed:  [facility] Booklet, post op teaching sheet, Map, Soap, and arrival/location information.    Surgical instructions given to patient in clinic: Yes.    Instructional Materials given:  Before your surgery packet , Medications to avoid before surgery , Showering or Bathing instructions before surgery  and What to expect after surgery  Total  time with patient: 10 minutes    Sandra Aguirre RN

## 2019-06-19 LAB
BACTERIA SPEC CULT: NO GROWTH
Lab: NORMAL
SPECIMEN SOURCE: NORMAL

## 2019-07-02 DIAGNOSIS — C61 PROSTATE CANCER (H): Primary | ICD-10-CM

## 2019-07-09 DIAGNOSIS — N13.8 BENIGN PROSTATIC HYPERPLASIA WITH URINARY OBSTRUCTION: ICD-10-CM

## 2019-07-09 DIAGNOSIS — N40.1 BENIGN PROSTATIC HYPERPLASIA WITH URINARY OBSTRUCTION: ICD-10-CM

## 2019-07-10 ENCOUNTER — TELEPHONE (OUTPATIENT)
Dept: UROLOGY | Facility: CLINIC | Age: 69
End: 2019-07-10

## 2019-07-10 RX ORDER — OXYBUTYNIN CHLORIDE 5 MG/1
5 TABLET, EXTENDED RELEASE ORAL DAILY
Qty: 90 TABLET | Refills: 0 | Status: CANCELLED | OUTPATIENT
Start: 2019-07-10

## 2019-07-10 NOTE — TELEPHONE ENCOUNTER
Patient's states that he found his surgery packet folder that he will take to his pre op appointment on 7/12/2019. Patient was notified to leave an urine specimen for an urine culture. Patient agreed with the plan.      Nina Wetzel MA

## 2019-07-10 NOTE — TELEPHONE ENCOUNTER
[facility] Call Center    Phone Message    May a detailed message be left on voicemail: yes    Reason for Call: Other: pt called in needs information for pre op appointment, appointment is scheduled for friday  wants call back asap      Action Taken: Message routed to:  [facility] uro

## 2019-07-10 NOTE — TELEPHONE ENCOUNTER
oxybutynin ER (DITROPAN-XL) 5 MG 24 hr tablet      Last Written Prescription Date:  6-18-19  Last Fill Quantity: 30,   # refills: 1  Last Office Visit : 6-18-19  Future Office visit:  9-17-19           (Surgery 7-25-19)    Routing refill request to provider for review/approval because:  Request 90 day  New med rx for  2 month supply  ? authorize 90 day

## 2019-07-11 DIAGNOSIS — N13.8 BENIGN PROSTATIC HYPERPLASIA WITH URINARY OBSTRUCTION: ICD-10-CM

## 2019-07-11 DIAGNOSIS — N40.1 BENIGN PROSTATIC HYPERPLASIA WITH URINARY OBSTRUCTION: ICD-10-CM

## 2019-07-11 RX ORDER — OXYBUTYNIN CHLORIDE 5 MG/1
5 TABLET, EXTENDED RELEASE ORAL DAILY
Qty: 90 TABLET | Refills: 3 | Status: SHIPPED | OUTPATIENT
Start: 2019-07-11 | End: 2019-11-26

## 2019-07-18 ENCOUNTER — TELEPHONE (OUTPATIENT)
Dept: UROLOGY | Facility: CLINIC | Age: 69
End: 2019-07-18

## 2019-07-18 NOTE — TELEPHONE ENCOUNTER
[facility] Call Center    Phone Message    May a detailed message be left on voicemail: yes    Reason for Call: Symptoms or Concerns     If patient has red-flag symptoms, warm transfer to triage line    Current symptom or concern: Abdominal pressure    Symptoms have been present for:  2 week(s)    Has patient previously been seen for this? No    By : Dr Carroll    Date:     Are there any new or worsening symptoms? Yes: pt has surgery on 7/25/2019 and says that the symptom of pressure like a belt tightening around his bladder. He knows he's not supposed to be on medication prior to surgery by 7 days. Please call him back to discuss if he can take Asprin or next step.       Action Taken: Message routed to:  [facility]

## 2019-07-19 ENCOUNTER — TRANSFERRED RECORDS (OUTPATIENT)
Dept: HEALTH INFORMATION MANAGEMENT | Facility: CLINIC | Age: 69
End: 2019-07-19

## 2019-07-19 NOTE — TELEPHONE ENCOUNTER
Pablo Flores,      Patient was notified that he cannot take Aspirin. He states that he has not send take the Aspirin.  He states that he is urination every 35-40 minutes (small amount). He states that he is having abdominal pressure for 2 weeks. He wants to know the next steps. He have surgery on 7/25/2019.       Nina Wetzel MA

## 2019-07-19 NOTE — TELEPHONE ENCOUNTER
I called and spoke with patient and answered questions about his upcoming surgery. Sandra Aguirre RN

## 2019-07-25 ENCOUNTER — HOSPITAL ENCOUNTER (OUTPATIENT)
Facility: CLINIC | Age: 69
Discharge: HOME | End: 2019-07-26
Attending: UROLOGY | Admitting: UROLOGY
Payer: COMMERCIAL

## 2019-07-25 ENCOUNTER — ANESTHESIA (OUTPATIENT)
Dept: SURGERY | Facility: CLINIC | Age: 69
End: 2019-07-25
Payer: COMMERCIAL

## 2019-07-25 ENCOUNTER — ANESTHESIA EVENT (OUTPATIENT)
Dept: SURGERY | Facility: CLINIC | Age: 69
End: 2019-07-25
Payer: COMMERCIAL

## 2019-07-25 DIAGNOSIS — N13.8 BENIGN PROSTATIC HYPERPLASIA WITH URINARY OBSTRUCTION: Primary | ICD-10-CM

## 2019-07-25 DIAGNOSIS — N40.1 BENIGN PROSTATIC HYPERPLASIA WITH URINARY OBSTRUCTION: Primary | ICD-10-CM

## 2019-07-25 LAB
ABO + RH BLD: NORMAL
ABO + RH BLD: NORMAL
BLD GP AB SCN SERPL QL: NORMAL
BLOOD BANK CMNT PATIENT-IMP: NORMAL
GLUCOSE SERPL-MCNC: 92 MG/DL (ref 70–99)
SPECIMEN EXP DATE BLD: NORMAL

## 2019-07-25 PROCEDURE — C1758 CATHETER, URETERAL: HCPCS | Performed by: UROLOGY

## 2019-07-25 PROCEDURE — 86900 BLOOD TYPING SEROLOGIC ABO: CPT | Performed by: UROLOGY

## 2019-07-25 PROCEDURE — 71000015 ZZH RECOVERY PHASE 1 LEVEL 2 EA ADDTL HR: Performed by: UROLOGY

## 2019-07-25 PROCEDURE — 25000132 ZZH RX MED GY IP 250 OP 250 PS 637: Performed by: STUDENT IN AN ORGANIZED HEALTH CARE EDUCATION/TRAINING PROGRAM

## 2019-07-25 PROCEDURE — 25800030 ZZH RX IP 258 OP 636: Performed by: NURSE ANESTHETIST, CERTIFIED REGISTERED

## 2019-07-25 PROCEDURE — 36415 COLL VENOUS BLD VENIPUNCTURE: CPT | Performed by: UROLOGY

## 2019-07-25 PROCEDURE — 88305 TISSUE EXAM BY PATHOLOGIST: CPT | Mod: 26 | Performed by: UROLOGY

## 2019-07-25 PROCEDURE — 40000170 ZZH STATISTIC PRE-PROCEDURE ASSESSMENT II: Performed by: UROLOGY

## 2019-07-25 PROCEDURE — 25000128 H RX IP 250 OP 636: Performed by: NURSE ANESTHETIST, CERTIFIED REGISTERED

## 2019-07-25 PROCEDURE — 25000128 H RX IP 250 OP 636: Performed by: ANESTHESIOLOGY

## 2019-07-25 PROCEDURE — 71000014 ZZH RECOVERY PHASE 1 LEVEL 2 FIRST HR: Performed by: UROLOGY

## 2019-07-25 PROCEDURE — 86901 BLOOD TYPING SEROLOGIC RH(D): CPT | Performed by: UROLOGY

## 2019-07-25 PROCEDURE — 27210794 ZZH OR GENERAL SUPPLY STERILE: Performed by: UROLOGY

## 2019-07-25 PROCEDURE — 25000125 ZZHC RX 250: Performed by: NURSE ANESTHETIST, CERTIFIED REGISTERED

## 2019-07-25 PROCEDURE — 25000565 ZZH ISOFLURANE, EA 15 MIN: Performed by: UROLOGY

## 2019-07-25 PROCEDURE — 86850 RBC ANTIBODY SCREEN: CPT | Performed by: UROLOGY

## 2019-07-25 PROCEDURE — 37000009 ZZH ANESTHESIA TECHNICAL FEE, EACH ADDTL 15 MIN: Performed by: UROLOGY

## 2019-07-25 PROCEDURE — 37000008 ZZH ANESTHESIA TECHNICAL FEE, 1ST 30 MIN: Performed by: UROLOGY

## 2019-07-25 PROCEDURE — 25000128 H RX IP 250 OP 636: Performed by: UROLOGY

## 2019-07-25 PROCEDURE — 88305 TISSUE EXAM BY PATHOLOGIST: CPT | Performed by: UROLOGY

## 2019-07-25 PROCEDURE — 82947 ASSAY GLUCOSE BLOOD QUANT: CPT | Performed by: ANESTHESIOLOGY

## 2019-07-25 PROCEDURE — 36000064 ZZH SURGERY LEVEL 4 EA 15 ADDTL MIN - UMMC: Performed by: UROLOGY

## 2019-07-25 PROCEDURE — 36000062 ZZH SURGERY LEVEL 4 1ST 30 MIN - UMMC: Performed by: UROLOGY

## 2019-07-25 RX ORDER — CEFAZOLIN SODIUM 1 G/3ML
1 INJECTION, POWDER, FOR SOLUTION INTRAMUSCULAR; INTRAVENOUS SEE ADMIN INSTRUCTIONS
Status: DISCONTINUED | OUTPATIENT
Start: 2019-07-25 | End: 2019-07-25 | Stop reason: HOSPADM

## 2019-07-25 RX ORDER — ONDANSETRON 2 MG/ML
4 INJECTION INTRAMUSCULAR; INTRAVENOUS EVERY 6 HOURS PRN
Status: DISCONTINUED | OUTPATIENT
Start: 2019-07-25 | End: 2019-07-26 | Stop reason: HOSPADM

## 2019-07-25 RX ORDER — ONDANSETRON 4 MG/1
4 TABLET, ORALLY DISINTEGRATING ORAL EVERY 30 MIN PRN
Status: DISCONTINUED | OUTPATIENT
Start: 2019-07-25 | End: 2019-07-25 | Stop reason: HOSPADM

## 2019-07-25 RX ORDER — CIPROFLOXACIN 500 MG/1
500 TABLET, FILM COATED ORAL 2 TIMES DAILY
Qty: 6 TABLET | Refills: 0 | Status: SHIPPED | OUTPATIENT
Start: 2019-07-25 | End: 2019-07-28

## 2019-07-25 RX ORDER — PROPOFOL 10 MG/ML
INJECTION, EMULSION INTRAVENOUS PRN
Status: DISCONTINUED | OUTPATIENT
Start: 2019-07-25 | End: 2019-07-25

## 2019-07-25 RX ORDER — SODIUM CHLORIDE, SODIUM LACTATE, POTASSIUM CHLORIDE, CALCIUM CHLORIDE 600; 310; 30; 20 MG/100ML; MG/100ML; MG/100ML; MG/100ML
INJECTION, SOLUTION INTRAVENOUS CONTINUOUS
Status: DISCONTINUED | OUTPATIENT
Start: 2019-07-25 | End: 2019-07-25 | Stop reason: HOSPADM

## 2019-07-25 RX ORDER — ACETAMINOPHEN 325 MG/1
650 TABLET ORAL
Status: COMPLETED | OUTPATIENT
Start: 2019-07-25 | End: 2019-07-25

## 2019-07-25 RX ORDER — SODIUM CHLORIDE, SODIUM LACTATE, POTASSIUM CHLORIDE, CALCIUM CHLORIDE 600; 310; 30; 20 MG/100ML; MG/100ML; MG/100ML; MG/100ML
INJECTION, SOLUTION INTRAVENOUS CONTINUOUS PRN
Status: DISCONTINUED | OUTPATIENT
Start: 2019-07-25 | End: 2019-07-25

## 2019-07-25 RX ORDER — HYDROMORPHONE HYDROCHLORIDE 1 MG/ML
.3-.5 INJECTION, SOLUTION INTRAMUSCULAR; INTRAVENOUS; SUBCUTANEOUS EVERY 10 MIN PRN
Status: DISCONTINUED | OUTPATIENT
Start: 2019-07-25 | End: 2019-07-25 | Stop reason: HOSPADM

## 2019-07-25 RX ORDER — OXYBUTYNIN CHLORIDE 5 MG/1
5 TABLET ORAL 2 TIMES DAILY PRN
Status: DISCONTINUED | OUTPATIENT
Start: 2019-07-25 | End: 2019-07-26 | Stop reason: HOSPADM

## 2019-07-25 RX ORDER — DIAZEPAM 10 MG/2ML
2.5 INJECTION, SOLUTION INTRAMUSCULAR; INTRAVENOUS
Status: DISCONTINUED | OUTPATIENT
Start: 2019-07-25 | End: 2019-07-25 | Stop reason: HOSPADM

## 2019-07-25 RX ORDER — IBUPROFEN 600 MG/1
600 TABLET, FILM COATED ORAL
Status: DISCONTINUED | OUTPATIENT
Start: 2019-07-25 | End: 2019-07-25

## 2019-07-25 RX ORDER — FENTANYL CITRATE 50 UG/ML
25-50 INJECTION, SOLUTION INTRAMUSCULAR; INTRAVENOUS
Status: DISCONTINUED | OUTPATIENT
Start: 2019-07-25 | End: 2019-07-25 | Stop reason: HOSPADM

## 2019-07-25 RX ORDER — ACETAMINOPHEN 325 MG/1
650 TABLET ORAL EVERY 4 HOURS PRN
Status: DISCONTINUED | OUTPATIENT
Start: 2019-07-25 | End: 2019-07-26 | Stop reason: HOSPADM

## 2019-07-25 RX ORDER — ONDANSETRON 2 MG/ML
INJECTION INTRAMUSCULAR; INTRAVENOUS PRN
Status: DISCONTINUED | OUTPATIENT
Start: 2019-07-25 | End: 2019-07-25

## 2019-07-25 RX ORDER — SODIUM CHLORIDE, SODIUM LACTATE, POTASSIUM CHLORIDE, CALCIUM CHLORIDE 600; 310; 30; 20 MG/100ML; MG/100ML; MG/100ML; MG/100ML
INJECTION, SOLUTION INTRAVENOUS CONTINUOUS
Status: DISCONTINUED | OUTPATIENT
Start: 2019-07-25 | End: 2019-07-26

## 2019-07-25 RX ORDER — LIDOCAINE 40 MG/G
CREAM TOPICAL
Status: DISCONTINUED | OUTPATIENT
Start: 2019-07-25 | End: 2019-07-25 | Stop reason: HOSPADM

## 2019-07-25 RX ORDER — NALOXONE HYDROCHLORIDE 0.4 MG/ML
.1-.4 INJECTION, SOLUTION INTRAMUSCULAR; INTRAVENOUS; SUBCUTANEOUS
Status: DISCONTINUED | OUTPATIENT
Start: 2019-07-25 | End: 2019-07-25 | Stop reason: HOSPADM

## 2019-07-25 RX ORDER — MEPERIDINE HYDROCHLORIDE 25 MG/ML
12.5 INJECTION INTRAMUSCULAR; INTRAVENOUS; SUBCUTANEOUS
Status: DISCONTINUED | OUTPATIENT
Start: 2019-07-25 | End: 2019-07-25 | Stop reason: HOSPADM

## 2019-07-25 RX ORDER — LIDOCAINE 40 MG/G
CREAM TOPICAL
Status: DISCONTINUED | OUTPATIENT
Start: 2019-07-25 | End: 2019-07-26 | Stop reason: HOSPADM

## 2019-07-25 RX ORDER — NALOXONE HYDROCHLORIDE 0.4 MG/ML
.1-.4 INJECTION, SOLUTION INTRAMUSCULAR; INTRAVENOUS; SUBCUTANEOUS
Status: DISCONTINUED | OUTPATIENT
Start: 2019-07-25 | End: 2019-07-26 | Stop reason: HOSPADM

## 2019-07-25 RX ORDER — TAMSULOSIN HYDROCHLORIDE 0.4 MG/1
0.4 CAPSULE ORAL AT BEDTIME
Status: DISCONTINUED | OUTPATIENT
Start: 2019-07-25 | End: 2019-07-26 | Stop reason: HOSPADM

## 2019-07-25 RX ORDER — ONDANSETRON 2 MG/ML
4 INJECTION INTRAMUSCULAR; INTRAVENOUS EVERY 30 MIN PRN
Status: DISCONTINUED | OUTPATIENT
Start: 2019-07-25 | End: 2019-07-25 | Stop reason: HOSPADM

## 2019-07-25 RX ORDER — IBUPROFEN 600 MG/1
600 TABLET, FILM COATED ORAL EVERY 6 HOURS PRN
Status: DISCONTINUED | OUTPATIENT
Start: 2019-07-25 | End: 2019-07-26 | Stop reason: HOSPADM

## 2019-07-25 RX ORDER — CEFAZOLIN SODIUM 2 G/100ML
2 INJECTION, SOLUTION INTRAVENOUS
Status: COMPLETED | OUTPATIENT
Start: 2019-07-25 | End: 2019-07-25

## 2019-07-25 RX ORDER — FENTANYL CITRATE 50 UG/ML
INJECTION, SOLUTION INTRAMUSCULAR; INTRAVENOUS PRN
Status: DISCONTINUED | OUTPATIENT
Start: 2019-07-25 | End: 2019-07-25

## 2019-07-25 RX ORDER — LIDOCAINE HYDROCHLORIDE 20 MG/ML
INJECTION, SOLUTION INFILTRATION; PERINEURAL PRN
Status: DISCONTINUED | OUTPATIENT
Start: 2019-07-25 | End: 2019-07-25

## 2019-07-25 RX ORDER — DEXAMETHASONE SODIUM PHOSPHATE 4 MG/ML
INJECTION, SOLUTION INTRA-ARTICULAR; INTRALESIONAL; INTRAMUSCULAR; INTRAVENOUS; SOFT TISSUE PRN
Status: DISCONTINUED | OUTPATIENT
Start: 2019-07-25 | End: 2019-07-25

## 2019-07-25 RX ORDER — ONDANSETRON 4 MG/1
4 TABLET, ORALLY DISINTEGRATING ORAL EVERY 6 HOURS PRN
Status: DISCONTINUED | OUTPATIENT
Start: 2019-07-25 | End: 2019-07-26 | Stop reason: HOSPADM

## 2019-07-25 RX ADMIN — SUGAMMADEX 160 MG: 100 INJECTION, SOLUTION INTRAVENOUS at 10:27

## 2019-07-25 RX ADMIN — PROPOFOL 200 MG: 10 INJECTION, EMULSION INTRAVENOUS at 09:11

## 2019-07-25 RX ADMIN — FENTANYL CITRATE 50 MCG: 50 INJECTION, SOLUTION INTRAMUSCULAR; INTRAVENOUS at 10:47

## 2019-07-25 RX ADMIN — SODIUM CHLORIDE, POTASSIUM CHLORIDE, SODIUM LACTATE AND CALCIUM CHLORIDE: 600; 310; 30; 20 INJECTION, SOLUTION INTRAVENOUS at 09:08

## 2019-07-25 RX ADMIN — DEXAMETHASONE SODIUM PHOSPHATE 8 MG: 4 INJECTION, SOLUTION INTRAMUSCULAR; INTRAVENOUS at 09:34

## 2019-07-25 RX ADMIN — ROCURONIUM BROMIDE 50 MG: 10 INJECTION INTRAVENOUS at 09:11

## 2019-07-25 RX ADMIN — FENTANYL CITRATE 25 MCG: 50 INJECTION, SOLUTION INTRAMUSCULAR; INTRAVENOUS at 10:18

## 2019-07-25 RX ADMIN — MIDAZOLAM 2 MG: 1 INJECTION INTRAMUSCULAR; INTRAVENOUS at 09:05

## 2019-07-25 RX ADMIN — ACETAMINOPHEN 650 MG: 325 TABLET, FILM COATED ORAL at 12:22

## 2019-07-25 RX ADMIN — IBUPROFEN 600 MG: 600 TABLET ORAL at 22:03

## 2019-07-25 RX ADMIN — FENTANYL CITRATE 50 MCG: 50 INJECTION INTRAMUSCULAR; INTRAVENOUS at 11:26

## 2019-07-25 RX ADMIN — FENTANYL CITRATE 25 MCG: 50 INJECTION, SOLUTION INTRAMUSCULAR; INTRAVENOUS at 10:50

## 2019-07-25 RX ADMIN — ONDANSETRON 4 MG: 2 INJECTION INTRAMUSCULAR; INTRAVENOUS at 10:27

## 2019-07-25 RX ADMIN — FENTANYL CITRATE 25 MCG: 50 INJECTION INTRAMUSCULAR; INTRAVENOUS at 11:20

## 2019-07-25 RX ADMIN — PROPOFOL 50 MG: 10 INJECTION, EMULSION INTRAVENOUS at 09:14

## 2019-07-25 RX ADMIN — ACETAMINOPHEN 650 MG: 325 TABLET, FILM COATED ORAL at 17:52

## 2019-07-25 RX ADMIN — LIDOCAINE HYDROCHLORIDE 80 MG: 20 INJECTION, SOLUTION INFILTRATION; PERINEURAL at 09:11

## 2019-07-25 RX ADMIN — FENTANYL CITRATE 50 MCG: 50 INJECTION, SOLUTION INTRAMUSCULAR; INTRAVENOUS at 09:38

## 2019-07-25 RX ADMIN — TAMSULOSIN HYDROCHLORIDE 0.4 MG: 0.4 CAPSULE ORAL at 22:03

## 2019-07-25 RX ADMIN — FENTANYL CITRATE 50 MCG: 50 INJECTION INTRAMUSCULAR; INTRAVENOUS at 11:37

## 2019-07-25 RX ADMIN — FENTANYL CITRATE 50 MCG: 50 INJECTION, SOLUTION INTRAMUSCULAR; INTRAVENOUS at 09:11

## 2019-07-25 RX ADMIN — CEFAZOLIN SODIUM 2 G: 2 INJECTION, SOLUTION INTRAVENOUS at 09:20

## 2019-07-25 RX ADMIN — FENTANYL CITRATE 50 MCG: 50 INJECTION INTRAMUSCULAR; INTRAVENOUS at 11:43

## 2019-07-25 ASSESSMENT — PAIN DESCRIPTION - DESCRIPTORS: DESCRIPTORS: TENDER

## 2019-07-25 ASSESSMENT — MIFFLIN-ST. JEOR: SCORE: 1674.63

## 2019-07-25 NOTE — ANESTHESIA PREPROCEDURE EVALUATION
Anesthesia Pre-Procedure Evaluation    Patient: Sidney Preciado   MRN:     [id number] Gender:   male   Age:    69 year old :      1950        Preoperative Diagnosis: Lower Urinary Tract Symptoms   Procedure(s):  Holmium Laser Enucleation of the Prostate     Past Medical History:   Diagnosis Date     BPH (benign prostatic hyperplasia)      Hypermetropia, bilateral       Past Surgical History:   Procedure Laterality Date     PROSTATE SURGERY            Anesthesia Evaluation     . Pt has had prior anesthetic.            ROS/MED HX    ENT/Pulmonary:     (+)sleep apnea, , . .    Neurologic:       Cardiovascular:     (+) Dyslipidemia, ----. : . . . :. . Previous cardiac testing date:results:date: results:ECG reviewed date:2019 results:NSR with incomplete bundle branch block per [facility] & [facility]. date: results:          METS/Exercise Tolerance:     Hematologic:         Musculoskeletal:         GI/Hepatic:     (+) hepatitis type C,       Renal/Genitourinary:         Endo:         Psychiatric:     (+) psychiatric history anxiety (Panic attacks)      Infectious Disease:         Malignancy:         Other: Comment: Hx of insomnia                        PHYSICAL EXAM:   Mental Status/Neuro: A/A/O   Airway: Facies: Feasible  Mallampati: II  Mouth/Opening: Full  TM distance: > 6 cm  Neck ROM: Limited   Respiratory: Auscultation: CTAB     Resp. Rate: Normal     Resp. Effort: Normal      CV: Rhythm: Regular  Rate: Age appropriate  Heart: Normal Sounds  Edema: None   Comments:      Dental: Normal Dentition                LABS:  CBC: No results found for: WBC, HGB, HCT, PLT  BMP: No results found for: NA, POTASSIUM, CHLORIDE, CO2, BUN, CR, GLC  COAGS: No results found for: PTT, INR, FIBR  POC: No results found for: BGM, HCG, HCGS  OTHER: No results found for: PH, LACT, A1C, COSMO, PHOS, MAG, ALBUMIN, PROTTOTAL, ALT, AST, GGT, ALKPHOS, BILITOTAL, BILIDIRECT, LIPASE, AMYLASE, EMILI, TSH, T4, T3, CRP, SED     Preop Vitals    BP Readings  "from Last 3 Encounters:   06/18/19 115/78   05/30/19 129/78   07/18/18 103/72    Pulse Readings from Last 3 Encounters:   06/18/19 62   05/30/19 62   07/18/18 67      Resp Readings from Last 3 Encounters:   08/11/15 17    SpO2 Readings from Last 3 Encounters:   08/11/15 95%      Temp Readings from Last 1 Encounters:   08/11/15 36.3  C (97.4  F)    Ht Readings from Last 1 Encounters:   06/18/19 1.905 m (6' 3\")      Wt Readings from Last 1 Encounters:   06/18/19 81.6 kg (180 lb)    Estimated body mass index is 22.5 kg/m  as calculated from the following:    Height as of 6/18/19: 1.905 m (6' 3\").    Weight as of 6/18/19: 81.6 kg (180 lb).     LDA:        Assessment:   ASA SCORE: 2    H&P: History and physical reviewed and following examination; no interval change.         Plan:   Anes. Type:  General   Pre-Medication: Midazolam   Induction:  IV (Standard)   Airway: ETT; Oral; CMAC/VL   Access/Monitoring: PIV   Maintenance: Balanced     Postop Plan:   Postop Pain: Opioids  Postop Sedation/Airway: Not planned     PONV Management:   Adult Risk Factors:, Postop Opioids   Prevention: Ondansetron     CONSENT: Direct conversation   Plan and risks discussed with: Patient; Spouse                           Plan  -  GETA ([facility])      The risks, benefits and possible complications of GETA discussed in full with the patient and his wife.  She voices understanding and wishes to proceed.        Dr. Izzy Altamirano MD  Anesthesia  07/25/2019 @ 0825 am        Izzy Altamirano MD  "

## 2019-07-25 NOTE — ANESTHESIA POSTPROCEDURE EVALUATION
Anesthesia POST Procedure Evaluation    Patient: Sidney Preciado   MRN:     [id number] Gender:   male   Age:    69 year old :      1950        Preoperative Diagnosis: Lower Urinary Tract Symptoms   Procedure(s):  Holmium Laser Enucleation of the Prostate   Postop Comments: No value filed.       Anesthesia Type:  Not documented  General    Reportable Event: NO     PAIN: Uncomplicated   Sign Out status: Comfortable, Well controlled pain     PONV: No PONV   Sign Out status:  No Nausea or Vomiting     Neuro/Psych: Uneventful perioperative course   Sign Out Status: Preoperative baseline; Age appropriate mentation     Airway/Resp.: Uneventful perioperative course   Sign Out Status: Non labored breathing, age appropriate RR; Resp. Status within EXPECTED Parameters     CV: Uneventful perioperative course   Sign Out status: Appropriate BP and perfusion indices; Appropriate HR/Rhythm     Disposition:   Sign Out in:  PACU  Disposition:  Phase II; Home  Recovery Course: Uneventful  Follow-Up: Not required     Comments/Narrative:  Pt seen in PACU.  Fully awake and alert, in NAD.  Conversing with staff.  VSS.  No apparent anesthesia complications.  Anticipate discharge soon.        Dr. Izzy Altamirano MD  Anesthesia  2019 @ 1210 pm.           Last Anesthesia Record Vitals:  CRNA VITALS  2019 1012 - 2019 1112      2019             EKG:  Sinus rhythm          Last PACU Vitals:  Vitals Value Taken Time   /94 2019 12:00 PM   Temp 36.4  C (97.5  F) 2019 11:15 AM   Pulse 61 2019 12:00 PM   Resp 6 2019 12:08 PM   SpO2 93 % 2019 12:08 PM   Temp src     NIBP     Pulse     SpO2     Resp     Temp     Ht Rate     Temp 2     Vitals shown include unvalidated device data.      Electronically Signed By: Izzy Altamirano MD, 2019, 12:09 PM

## 2019-07-25 NOTE — OR NURSING
CBI tubing clamped for 30 minutes. Drainage color changed from clear pink to red. Opened clamp to CBI to allow for drainage. Clot noted when irrigated. Dr Carroll at bedside to see patient and assess irrigation. Patient to be admitted overnight.

## 2019-07-25 NOTE — PLAN OF CARE
Nursing   post op laser prostatectomy  S- Pt arrived from PACU on cart awake, alert, cooperative, pleasant.  Asking about getting up to walk this tiffani.  CBI draining scott, w small clot noted on med rate, increased rate to clear urine then decreased,  has cont to be cherry w tiny clots- increased rate to fast.  Pt states some discomfrot w penis/ tenderness.   Jeronimo taped to leg.   Does IBD very well to 4000. Vss. Capno stable  Pheumos on   A- stable post op.  Some bleeding w cbi- increased rate  R- keep cbi pale pink with no clots.  Chair for meals, walk in roblse later.  Pt requests a nap now

## 2019-07-25 NOTE — ANESTHESIA CARE TRANSFER NOTE
Patient: Sidney Preciado    Procedure(s):  Holmium Laser Enucleation of the Prostate    Diagnosis: Lower Urinary Tract Symptoms  Diagnosis Additional Information: No value filed.    Anesthesia Type:   General     Note:  Airway :Face Mask  Patient transferred to:PACU  Comments: Transported to PACU with FM O2.  VSS.  Report given.  Patient comfortable.Handoff Report: Identifed the Patient, Identified the Reponsible Provider, Reviewed the pertinent medical history, Discussed the surgical course, Reviewed Intra-OP anesthesia mangement and issues during anesthesia, Set expectations for post-procedure period and Allowed opportunity for questions and acknowledgement of understanding      Vitals: (Last set prior to Anesthesia Care Transfer)    CRNA VITALS  7/25/2019 1012 - 7/25/2019 1049      7/25/2019             EKG:  Sinus rhythm                Electronically Signed By: SHAHAB Funes CRNA  July 25, 2019  10:49 AM

## 2019-07-25 NOTE — BRIEF OP NOTE
[facility], [place]    Brief Operative Note    Pre-operative diagnosis: Lower Urinary Tract Symptoms  Post-operative diagnosis * No post-op diagnosis entered *  Procedure: Procedure(s):  Holmium Laser Enucleation of the Prostate  Surgeon: Surgeon(s) and Role:     * Sidney Carroll MD - Primary     * Eric Ferguson MD - Resident - Assisting  Anesthesia: General   Estimated blood loss: Less than 100 ml  Drains: 3 way gonzalez catheter  Specimens:   ID Type Source Tests Collected by Time Destination   A :  Tissue Prostate SURGICAL PATHOLOGY EXAM Sidney Carroll MD 7/25/2019 10:21 AM      Findings:   None unexpected.  Complications: None.  Implants:  * No implants in log *     Post-op plan:  -Admit to outpatient in a bed  -Fill and TOV in AM POD#1    Eric Ferguson MD  Urology PGY2

## 2019-07-25 NOTE — OR NURSING
PACU to Inpatient Nursing Handoff    Patient Sidney Preciado is a 69 year old male who speaks English.   Procedure Procedure(s):  Holmium Laser Enucleation of the Prostate   Surgeon(s) Primary: Sidney Carroll MD  Resident - Assisting: Eric Ferguson MD     No Known Allergies    Isolation  No active isolations     Past Medical History   has a past medical history of BPH (benign prostatic hyperplasia) and Hypermetropia, bilateral.    Anesthesia General   Dermatome Level     Preop Meds Not applicable   Nerve block Not applicable   Intraop Meds fentanyl (Sublimaze): 200 mcg total, zofran 4mg,decadron 8mg   Local Meds No   Antibiotics cefazolin (Ancef) - last given at 0920     Pain Patient Currently in Pain: yes  Comfort: comfortably manageable  Pain Control: partially effective   PACU meds  acetaminophen (Tylenol): 650 mg (total dose) last given at 1245   fentanyl (Sublimaze): 175 mcg (total dose) last given at last dose 1143    PCA / epidural No   Capnography     Telemetry ECG Rhythm: Sinus bradycardia   Inpatient Telemetry Monitor Ordered? No        Labs Glucose Lab Results   Component Value Date    GLC 92 07/25/2019       Hgb No results found for: HGB    INR No results found for: INR   PACU Imaging Not applicable     Wound/Incision     CMS        Equipment Not applicable   Other LDA       IV Access Peripheral IV 07/25/19 Right Hand (Active)   Site Assessment WDL 7/25/2019 12:26 PM   Line Status Infusing 7/25/2019 12:26 PM   Phlebitis Scale 0-->no symptoms 7/25/2019 12:26 PM   Infiltration Scale 0 7/25/2019 12:26 PM   Infiltration Site Treatment Method  None 7/25/2019 12:26 PM   Extravasation? No 7/25/2019 12:26 PM   Number of days: 0      Blood Products Not applicable  mL   Intake/Output Date 07/25/19 0700 - 07/26/19 0659   Shift 4758-8749 6475-3258 9776-1800 24 Hour Total   INTAKE   I.V. 900   900   Shift Total(mL/kg) 900(11.06)   900(11.06)   OUTPUT   Urine 900   900   Shift Total(mL/kg) 900(11.06)    900(11.06)   Weight (kg) 81.4 81.4 81.4 81.4      Drains / Jeronimo Urethral Catheter Non-latex 22 fr (Active)   Collection Container Standard;Patent 7/25/2019 12:26 PM   Securement Method Tape 7/25/2019 12:26 PM   Rationale for Continued Use /GI/GYN Pelvic Procedure 7/25/2019 12:26 PM   Number of days: 0      Time of void PreOp Void Prior to Procedure: 0720 (07/25/19 0734)    PostOp      Diapered? No   Bladder Scan     PO    water, ice chips and pop     Vitals    B/P: (!) 123/95  T: 97.5  F (36.4  C)    Temp src: Oral  P:  Pulse: 55 (07/25/19 1300)    Heart Rate: 52 (07/25/19 1300)     R: 21  O2:  SpO2: 100 %    O2 Device: None (Room air) (07/25/19 1300)    Oxygen Delivery: 10 LPM (07/25/19 1045)         Family/support present significant other   Patient belongings     Patient transported on cart and air mat   DC meds/scripts (obs/outpt) Yes, meds   Inpatient Pain Meds Released? Yes       Special needs/considerations trial clamp of CBI for 30minutes. urine became dark red with MD darvin at bedside to assess. admit orders rec'd.    Tasks needing completion None       Angie Starks, RN  ASCOM [id number]

## 2019-07-26 ENCOUNTER — PATIENT OUTREACH (OUTPATIENT)
Dept: UROLOGY | Facility: CLINIC | Age: 69
End: 2019-07-26

## 2019-07-26 VITALS
BODY MASS INDEX: 22.31 KG/M2 | SYSTOLIC BLOOD PRESSURE: 120 MMHG | DIASTOLIC BLOOD PRESSURE: 77 MMHG | RESPIRATION RATE: 14 BRPM | HEART RATE: 62 BPM | OXYGEN SATURATION: 97 % | TEMPERATURE: 98 F | WEIGHT: 179.45 LBS | HEIGHT: 75 IN

## 2019-07-26 LAB
COPATH REPORT: NORMAL
GLUCOSE BLDC GLUCOMTR-MCNC: 119 MG/DL (ref 70–99)

## 2019-07-26 PROCEDURE — 25000132 ZZH RX MED GY IP 250 OP 250 PS 637: Performed by: STUDENT IN AN ORGANIZED HEALTH CARE EDUCATION/TRAINING PROGRAM

## 2019-07-26 PROCEDURE — 82962 GLUCOSE BLOOD TEST: CPT

## 2019-07-26 RX ADMIN — IBUPROFEN 600 MG: 600 TABLET ORAL at 06:04

## 2019-07-26 RX ADMIN — ACETAMINOPHEN 650 MG: 325 TABLET, FILM COATED ORAL at 06:50

## 2019-07-26 RX ADMIN — BENZOCAINE, MENTHOL 1 LOZENGE: 15; 3.6 LOZENGE ORAL at 06:50

## 2019-07-26 RX ADMIN — ACETAMINOPHEN 650 MG: 325 TABLET, FILM COATED ORAL at 02:28

## 2019-07-26 NOTE — PLAN OF CARE
"VS:   /68   Pulse 56   Temp 95.4  F (35.2  C) (Oral)   Resp 10   Ht 1.905 m (6' 3\")   Wt 81.4 kg (179 lb 7.3 oz)   SpO2 96%   BMI 22.43 kg/m     Output:   CBI draining pink urine with very small clots, irrigation at a fast rate. CBI drainage cherry-colored with moderate rate, rate kept fast.   Lungs Clear, equal bilaterally. Capno stable.   Activity:   Ambulated in robles with assist of 1. Gait steady. Pt stated that he felt better after standing and walking. Pt stood near bed for about 20 minutes after ambulation.    Skin: Intact ex claudia incision   Pain:   Denies pain when in bed, but endorses pain with movement and ambulation. Pain controlled with tylenol and ibuprofen.   Neuro/CMS:   CMS intact. A&O x4.   Dressing(s):   Penile dressing CDI   Diet:   Regular diet. Pt tolerating food and fluids well.   LDA:   Jeronimo patent. PIV in R hand SL.   Equipment:   IV pole, capno   Additional Info:   Pt expresses anxiety about catheter removal and possibility of re-insertion due to history of similar surgery.     Outpatient goals:  -Pain controlled on oral meds: Met  -Tolerating regular diet : Met  -Urine is clear, no clots: Not met  -Patient able to ambulate as they were prior to admission or with assist devices provided by therapies during their stay: Met  "

## 2019-07-26 NOTE — OP NOTE
Operative Report  7/26/2019    PREOPERATIVE DIAGNOSIS:  Prostatic hypertrophy with lower urinary tract symptoms  POSTOPERATIVE DIAGNOSIS: Same as above    PROCEDURE PERFORMED: Holmium laser enucleation of the prostate  ATTENDING SURGEON: Sidney Carroll MD  RESIDENT SURGEON: Eric Ferguson MD    FINDINGS: Approximately 60 gm prostate with trilobar hypertrophy. Bladder with moderate trabeculation  ANESTHESIA: General   INTRAVENOUS FLUIDS: See anesthesia records  ESTIMATED BLOOD LOSS: Less than 100 ml   SPECIMENS: Prostate adenoma  DRAINS: 22-St Helenian 3-way catheter with 60 ml in balloon     INDICATIONS FOR PROCEDURE: Sidney Preciado is a(n) 69 year old male who was seen in consultation for BPH with obstructive voiding symptoms. He has elected treatment with laser enucleation. Prostate volume estimated to be 60 grams. His digital rectal exam was unremarkable.  He has a prior history of low grade prostate cancer as well as a prior Rezum which did not effectively manage symptoms. After discussion of the risks, benefits and alternatives of the procedure, the patient agreed to proceed with the above stated procdure.    DESCRIPTION OF PROCEDURE: After obtaining informed consent, the patient was taken to the operating room and placed under general anesthesia.  He was repositioned in dorsal lithotomy making sure that the legs were positioned and padded safely.  He was then prepped and draped in standard sterile fashion.  Culture directed antibiotics were administered and bilateral sequential compression devices were placed.  A time out was performed confirming the appropriate patient identity and planned procedure.     The procedure was begun by generously lubricating the urethra.  The urethra was noted to be patent and did not require use of the elia urethrotome in order to place the 26F outer sheath.  The outer sheath was placed over a deflecting obturator and we then looked the scope through the posterior urethra and  into the bladder.  The prostate was noted to have a trilobar configuration.  At this point we inserted the 550 micron holmium laser through the 7F laser catheter.    We began enucleation by making a 5 o'clock incision.  We carried this incision down to the prostatic capsule from the bladder neck towards the apex just proximal to the veromontanum.  We then proceeded with a bottom up  approach, electing to enucleate the left lobe first.  We performed the majority of the dissection at 40 cabrera making sure to keep the energy at 40 cabrera or lower when working near the apex.  The capsular planes on this side were noted to be very sticky.  There was a Rezum defect with lesser tissue and atypical appearing regrowth on this side as well..  Once the majority of the lobe was dissected we isolated the mucosal strip and transected it with the laser at 40 cabrera.  We then proceeded to take down the remaining lateral and posterior attachments and pushed the lobe into the bladder.  We then enucleated the contralateral lobe using a top down approach.  Capsular planes were noted to be better on this side.  The remaining bladder neck attachments were identified and transected, freeing the lobe up entirely. Total enucleation time was 32 minutes.    At this point there was a moderate amount of bleeding and approximately 10 minutes were spent identifying bleeding vessels in the fossa and coagulating them with the laser.  Once hemostasis was adequate we switched from the laser resectoscope to the 26F offset telescope with the Piranha morcellation device.  We added an extra inflow to distend the bladder.  The blades were adjusted and set at a rate of 1500 RPM.  We proceeded with morcellation making sure that we morcellated the entirety of the adenoma.  Total morcellation time was 4 minutes.     We then switched back to the laser resectoscope one final time to ensure that no residual tissue was left in the bladder.  We also confirmed that  the bladder was unharmed from morcellation and that both ureteral orifices were unharmed during the procedure.  We inspected the fossa and obtained final hemostasis.   We looked the scope out noting that the sphincter was completely intact without evidence of thermal or mechanical injury.     We lubricated the urethra again and passed a 22F 3-way gonzalez catheter over a catheter guide.  The urine was noted to be pink.  We filled the balloon with 60 ml of sterile water and did not place the catheter on traction.  The patient was woken from anesthesia and taken to the recovery room in stable condition.     The specimen was weighed and found to be approximately 24 g.     POSTOPERATIVE PLAN:   -We will monitor the patient post operatively on continuous bladder irrigation for signs of ongling bleeding.  If clear we will consider discharge with gonzalez removal as an outpatient.  If continues to have ongoing bleeding concerning for clot formation without bladder irrigation will plan to admit for observation    Sidney Carroll

## 2019-07-26 NOTE — PLAN OF CARE
Pt up independently. Jeronimo removed this AM and pt has voided x2 with PVR <100ml. Denies pain. Ambulated in hallway. Tolerating regular diet. Passing gas. Pt wanting to void 1 more time prior to discharge d/t issues in the past. PIV removed. Plan to discharge this afternoon.

## 2019-07-26 NOTE — PLAN OF CARE
"Vitals: /66 (BP Location: Right arm)   Pulse 56   Temp 97.2  F (36.2  C) (Oral)   Resp 10   Ht 1.905 m (6' 3\")   Wt 81.4 kg (179 lb 7.3 oz)   SpO2 95%   BMI 22.43 kg/m    A&O x 4.  Lung sounds clear. Denies CP, SOB.   Output: CBI - draining good pink color, irrigation slowed. No clots present.  Voided x1 post gonzalez removal. PVR 55.   Activity: SBA   Skin: Intact.   Pain: Mild pain at gonzalez site - managed with PRN tylenol and ibuprofen   CMS/Neuro: Intact.   Dressing: CDI   Diet: Regular - no n/v   LDA: Gonzalez patent  R hand PIV - SL   Equipment:    Plan/Add'l info: Gonzalez pulled this am by urology. Due to void.    Able to make needs known. Call light within reach. Continue with POC.  Discharge plan: home today.       Outpatient goals:  -Pain controlled on oral meds: Met  -Tolerating regular diet : Met  -Urine is clear, no clots: MET  -Patient able to ambulate as they were prior to admission or with assist devices provided by therapies during their stay: Met  "

## 2019-07-26 NOTE — PROGRESS NOTES
"Urology  Progress Note    NEON  Tolerating diet  Nervous about getting catheter out    Exam  /77 (BP Location: Right arm)   Pulse 62   Temp 98  F (36.7  C) (Oral)   Resp 14   Ht 1.905 m (6' 3\")   Wt 81.4 kg (179 lb 7.3 oz)   SpO2 97%   BMI 22.43 kg/m    No acute distress  Unlabored breathing  Abdomen soft, nontender, nondistended.  Jeronimo with light cherry urine in tubing     UOP CBI     Labs  No AM labs     Assessment/Plan  63 year old y/o male POD#1 s/p HoLEP. Catheter irrigated at bedside and removed.      Neuro: tylenol, ibuprofen for pain control  CV: HDS  Pulm: incentive spirometry while awake  FEN/GI: regular diet, dc MIVF  Endo: TING  : Jeronimo removed. Must void x2 prior to discharge  Heme: Hgb stable  ID: afebrile, no leukocystosis. Continue cipro.   PPx: SCDs.  Dispo: home today. Must void x2 prior to discharge     Seen and examined with the chief resident. Will discuss with Dr. Carroll.     Deann Davies, PGY-3  Urology Resident     Contacting the Urology Team     Please use the following job codes to reach the Urology Team. Note that you must use an in house phone and that job codes cannot receive text pages.     On weekdays, dial [phone] (or star-star-star [phone] on the new [employer] telephones) then [phone] to reach the Adult Urology resident or PA on call    On weekdays, dial [phone] (or star-star-star [phone] on the new [employer] telephones) then [phone] to reach the Pediatric Urology resident    On weeknights and weekends, dial [phone] (or star-star-star [phone] on the new [employer] telephones) then [phone] to reach the Urology resident on call (for both Adult and Pediatrics)              "

## 2019-07-26 NOTE — PROGRESS NOTES
POST OP-LM to return my call.  Follow up Sept 17th at 1pm with Dr. Carroll.    Post op~Left another message for patient to return my call.     Sidney Preciado,    How are you doing after your surgical procedure with Dr. Carroll?    Any fever, chills, nausea or vomiting?  No    How is your appetite? Normal    Are you drinking enough fluids? Yes    Have you had a bowel movement since you have been home? Yes, normal BM's    Are you having any difficulties with urination? No    Any problems with your catheter? NA    Is your urine clear yellow? Yes    How does your incision look? NA    How is your pain? Denies        Do you have any questions or concerns? Just about activity. I explained activity restrictions.     We have your post-operative appointment scheduled for September 17th at 1pm with Dr. Carroll. .    Please feel free to reply via [employer] or contact the clinic.  [phone], option #3 to speak to the nurse

## 2019-07-29 NOTE — DISCHARGE SUMMARY
Discharge Summary     Sidney Preciado MRN# [id number]   YOB: 1950 Age: 69 year old     Date of Admission:  7/25/2019  Date of Discharge::  7/26/2019  3:36 PM  Admitting Physician:  Sidney aCrroll MD  Discharge Physician:  Brien Salinas MD  Primary Care Physician:         Josselyn Gifford          Admission Diagnoses:   Lower Urinary Tract Symptoms  BPH (benign prostatic hyperplasia)          Discharge Diagnosis:   Same as above         Procedures:    Procedure(s):  Holmium Laser Enucleation of the Prostate        Non-operative procedures:   None performed          Consultations:   None         Imaging Studies:     Results for orders placed or performed during the hospital encounter of 05/31/19   MR Prostate wo & w Contrast    Narrative    MRI PROSTATE: 5/31/2019 4:00 PM    CLINICAL HISTORY: prostate cancer; Benign prostatic hyperplasia with  urinary obstruction; Benign prostatic hyperplasia with urinary  obstruction     Most Recent PSA: 3.02 ug/L    Comparison: MRI 8/25/2015.    TECHNIQUE:  The following sequences were obtained: High-resolution axial  T2-weighted, coronal T2-weighted, 3D volumetric T2-weighted, axial  pre-contrast T1, axial diffusion-weighted, axial apparent diffusion  coefficient and axial dynamic contrast-enhanced T1. Postcontrast  images were evaluated on a separate workstation to evaluate dynamic  contrast enhancement. The technique of this exam is PI-RADS v2.1  compliant. Contrast dose: 7.5mL Gadavist    FINDINGS:  Size: 59 grams  Hemorrhage: Mild  Peripheral zone: Heterogeneous on T2-weighted images. Regions of  mildly decreased signal on ADC or DWI which are best characterized as  PI-RADS 2 without highly suspicious lesion.  Transition zone: Enlarged with BPH changes. Transition zone nodules  which are circumscribed or mostly encapsulated without diffusion  restriction.  PI-RADS 2.  No highly suspicious nodules.    Neurovascular  bundles: No suspicion of involvement by malignancy  Seminal vesicles: Not involved by tumor. No focal abnormality.  Lymph nodes: No adenopathy.  Bones: No suspicious lesions. Mild degenerative changes of the lower  lumbar spine.  Other pelvic organs: Sigmoid diverticulosis. Bladder wall thickening  and trabeculation are probably sequelae of chronic bladder outlet  narrowing.        Impression    IMPRESSION: Based on the most suspicious abnormality, this exam is  characterized as PIRADS 2 - Clinically significant cancer is unlikely  to be present.      PIRADS? v2.1 Assessment Categories   PIRADS 1: Very low (clinically significant cancer is highly unlikely  to be present)   PIRADS 2: Low (clinically significant cancer is unlikely to be  present)   PIRADS 3: Intermediate (the presence of clinically significant cancer  is equivocal)   PIRADS 4: High (clinically significant cancer is likely to be present)    PIRADS 5: Very high (clinically significant cancer is highly likely to  be present)    I have personally reviewed the examination and initial interpretation  and I agree with the findings.    KATINA OLSON MD            Medications Prior to Admission:     No medications prior to admission.            Discharge Medications:     Discharge Medication List as of 7/26/2019  2:28 PM      CONTINUE these medications which have CHANGED    Details   ciprofloxacin (CIPRO) 500 MG tablet Take 1 tablet (500 mg) by mouth 2 times daily for 3 days, Disp-6 tablet, R-0, E-Prescribe         CONTINUE these medications which have NOT CHANGED    Details   diazepam (VALIUM) 10 MG tablet Take 1 tablet (10 mg) by mouth every 6 hours as needed for anxiety or sleep Take 30-60 minutes before procedure.  Do not operate a vehicle after taking this medication., Disp-1 tablet, R-0, Local Print      multivitamin, therapeutic (THERA-VIT) TABS Take 1 tablet by mouth daily Reported on 4/26/2017, Historical      omeprazole (PRILOSEC) 40 MG capsule Take  40 mg by mouth Reported on 4/26/2017, Historical      oxybutynin ER (DITROPAN-XL) 5 MG 24 hr tablet Take 1 tablet (5 mg) by mouth daily, Disp-90 tablet, R-3, E-Prescribe      tamsulosin (FLOMAX) 0.4 MG capsule Take 1 capsule (0.4 mg) by mouth daily, Disp-30 capsule, R-3, E-Prescribe      trospium (SANCTURA XR) 60 MG CP24 Take 1 capsule (60 mg) by mouth every morning, Disp-90 each, R-0, E-Prescribe      Zolpidem Tartrate (AMBIEN PO) Reported on 4/26/2017, Historical                    Brief History of Illness:   Reason for admission requiring a surgical or invasive procedure:   Lower Urinary Tract Symptoms   The patient underwent the following procedure(s):   See above   There were no immediate complications during this procedure.    Please refer to the full operative summary for details.           Hospital Course:   The patient's hospital course was unremarkable.  Sidney Preciado recovered as anticipated and experienced no post-operative complications.     On POD#1 patient was ambulating without assitance, tolerating the discharge diet, had pain controlled with PO medications to go home with, and requiring no IV medications or fluids. Patient was discharged home with appropriate contact information, follow-up and instructions as seen below in the discharge paperwork.         Final Pathology Result:   Pending at time of discharge         Discharge Instructions and Follow-Up:     Discharge Procedure Orders   Diet Instructions   Order Comments: Resume pre procedure diet     Encourage fluids   Order Comments: Encourage fluids at home to keep urine clear to light pink     Discharge Instructions   Order Comments: You will be contacted regarding scheduling of follow up Urology visit.     Discharge Instructions   Order Comments: Activity  - No strenuous exercise for 6 weeks.  - No bike riding for 6 weeks.  - No lifting, pushing, pulling more than 10 pounds for 6 weeks.   - Do not strain with bowel movements.  - Do not drive  "until you can press the brake pedal quickly and fully without pain.   - Do not operate a motor vehicle while taking narcotic pain medications.     Urination  - Catheter to be removed in the morning before hospital discharge  - Some light redness (up to a watermelon-like-pink in color) is expected in the upcoming 7-10 days.   - If having hematuria (blood in the urine), make sure to increase your water intake and monitor for improvement  - If you are unable to urinate you should return urgently to the ED or call clinic to try to arrange for an urgent visit same day.     Medications  - Use tylenol (acetaminophen) and ibuprofen for pain. Wean yourself off all pain medications as you are able.    Follow-Up:  - Call your primary care provider to touch base regarding your recent admission.    - Call or return sooner than your regularly scheduled visit if you develop any of the following: fever (greater than 101.5), uncontrolled pain, uncontrolled nausea or vomiting, as well as increased redness, swelling, or drainage from your wound.     Phone numbers:   - Monday through Friday 8am to 4:30pm: Call [phone] with questions or to schedule or confirm appointment.    - Nights or weekends: call the after hours emergency pager - [phone] and tell the  \"I would like to page the Urology Resident on call.\"  - For emergencies, call [phone]     Reason for your hospital stay   [facility]            Discharge Disposition:     Discharged to Home      Condition at discharge: Good    --    Brien Salinas MD  Urology Resident    7:39 PM, 7/28/2019    "

## 2019-08-01 ENCOUNTER — TELEPHONE (OUTPATIENT)
Dept: UROLOGY | Facility: CLINIC | Age: 69
End: 2019-08-01

## 2019-08-01 ENCOUNTER — HOSPITAL ENCOUNTER (EMERGENCY)
Facility: CLINIC | Age: 69
Discharge: HOME | End: 2019-08-01
Attending: FAMILY MEDICINE | Admitting: FAMILY MEDICINE
Payer: COMMERCIAL

## 2019-08-01 ENCOUNTER — TELEPHONE (OUTPATIENT)
Dept: CALL CENTER | Age: 69
End: 2019-08-01

## 2019-08-01 VITALS
RESPIRATION RATE: 14 BRPM | HEART RATE: 60 BPM | DIASTOLIC BLOOD PRESSURE: 70 MMHG | WEIGHT: 180 LBS | BODY MASS INDEX: 22.5 KG/M2 | SYSTOLIC BLOOD PRESSURE: 112 MMHG | TEMPERATURE: 97.1 F | OXYGEN SATURATION: 100 %

## 2019-08-01 DIAGNOSIS — R31.0 GROSS HEMATURIA: ICD-10-CM

## 2019-08-01 DIAGNOSIS — R33.9 URINARY RETENTION WITH INCOMPLETE BLADDER EMPTYING: ICD-10-CM

## 2019-08-01 LAB
ALBUMIN UR-MCNC: 300 MG/DL
AMORPH CRY #/AREA URNS HPF: ABNORMAL /HPF
ANION GAP SERPL CALCULATED.3IONS-SCNC: 6 MMOL/L (ref 3–14)
APPEARANCE UR: ABNORMAL
BASOPHILS # BLD AUTO: 0.1 10E9/L (ref 0–0.2)
BASOPHILS NFR BLD AUTO: 0.9 %
BILIRUB UR QL STRIP: NEGATIVE
BUN SERPL-MCNC: 21 MG/DL (ref 7–30)
CALCIUM SERPL-MCNC: 9 MG/DL (ref 8.5–10.1)
CHLORIDE SERPL-SCNC: 107 MMOL/L (ref 94–109)
CO2 SERPL-SCNC: 27 MMOL/L (ref 20–32)
COLOR UR AUTO: ABNORMAL
CREAT SERPL-MCNC: 0.96 MG/DL (ref 0.66–1.25)
DIFFERENTIAL METHOD BLD: NORMAL
EOSINOPHIL # BLD AUTO: 0.2 10E9/L (ref 0–0.7)
EOSINOPHIL NFR BLD AUTO: 3.2 %
ERYTHROCYTE [DISTWIDTH] IN BLOOD BY AUTOMATED COUNT: 12.5 % (ref 10–15)
GFR SERPL CREATININE-BSD FRML MDRD: 82 ML/MIN/{1.73_M2}
GLUCOSE SERPL-MCNC: 92 MG/DL (ref 70–99)
GLUCOSE UR STRIP-MCNC: NEGATIVE MG/DL
HCT VFR BLD AUTO: 43.2 % (ref 40–53)
HGB BLD-MCNC: 14.9 G/DL (ref 13.3–17.7)
HGB UR QL STRIP: ABNORMAL
IMM GRANULOCYTES # BLD: 0 10E9/L (ref 0–0.4)
IMM GRANULOCYTES NFR BLD: 0.7 %
KETONES UR STRIP-MCNC: NEGATIVE MG/DL
LEUKOCYTE ESTERASE UR QL STRIP: ABNORMAL
LYMPHOCYTES # BLD AUTO: 0.9 10E9/L (ref 0.8–5.3)
LYMPHOCYTES NFR BLD AUTO: 15.1 %
MCH RBC QN AUTO: 30.4 PG (ref 26.5–33)
MCHC RBC AUTO-ENTMCNC: 34.5 G/DL (ref 31.5–36.5)
MCV RBC AUTO: 88 FL (ref 78–100)
MONOCYTES # BLD AUTO: 0.5 10E9/L (ref 0–1.3)
MONOCYTES NFR BLD AUTO: 8 %
NEUTROPHILS # BLD AUTO: 4.1 10E9/L (ref 1.6–8.3)
NEUTROPHILS NFR BLD AUTO: 72.1 %
NITRATE UR QL: NEGATIVE
NRBC # BLD AUTO: 0 10*3/UL
NRBC BLD AUTO-RTO: 0 /100
PH UR STRIP: 8.5 PH (ref 5–7)
PLATELET # BLD AUTO: 193 10E9/L (ref 150–450)
POTASSIUM SERPL-SCNC: 4 MMOL/L (ref 3.4–5.3)
RBC # BLD AUTO: 4.9 10E12/L (ref 4.4–5.9)
RBC #/AREA URNS AUTO: >182 /HPF (ref 0–2)
SODIUM SERPL-SCNC: 140 MMOL/L (ref 133–144)
SOURCE: ABNORMAL
SP GR UR STRIP: 1.02 (ref 1–1.03)
UROBILINOGEN UR STRIP-MCNC: 0.2 MG/DL (ref 0–2)
WBC # BLD AUTO: 5.6 10E9/L (ref 4–11)
WBC #/AREA URNS AUTO: 4954 /HPF (ref 0–5)

## 2019-08-01 PROCEDURE — 99284 EMERGENCY DEPT VISIT MOD MDM: CPT | Mod: Z6 | Performed by: FAMILY MEDICINE

## 2019-08-01 PROCEDURE — 25000128 H RX IP 250 OP 636: Performed by: FAMILY MEDICINE

## 2019-08-01 PROCEDURE — 81001 URINALYSIS AUTO W/SCOPE: CPT | Performed by: FAMILY MEDICINE

## 2019-08-01 PROCEDURE — 96374 THER/PROPH/DIAG INJ IV PUSH: CPT | Performed by: FAMILY MEDICINE

## 2019-08-01 PROCEDURE — 25000132 ZZH RX MED GY IP 250 OP 250 PS 637: Performed by: FAMILY MEDICINE

## 2019-08-01 PROCEDURE — 99285 EMERGENCY DEPT VISIT HI MDM: CPT | Mod: 25 | Performed by: FAMILY MEDICINE

## 2019-08-01 PROCEDURE — 51798 US URINE CAPACITY MEASURE: CPT | Performed by: FAMILY MEDICINE

## 2019-08-01 PROCEDURE — 96361 HYDRATE IV INFUSION ADD-ON: CPT | Performed by: FAMILY MEDICINE

## 2019-08-01 PROCEDURE — 85025 COMPLETE CBC W/AUTO DIFF WBC: CPT | Performed by: FAMILY MEDICINE

## 2019-08-01 PROCEDURE — 80048 BASIC METABOLIC PNL TOTAL CA: CPT | Performed by: FAMILY MEDICINE

## 2019-08-01 PROCEDURE — 96376 TX/PRO/DX INJ SAME DRUG ADON: CPT | Performed by: FAMILY MEDICINE

## 2019-08-01 PROCEDURE — 87086 URINE CULTURE/COLONY COUNT: CPT | Performed by: FAMILY MEDICINE

## 2019-08-01 RX ORDER — HYDROMORPHONE HYDROCHLORIDE 1 MG/ML
0.5 INJECTION, SOLUTION INTRAMUSCULAR; INTRAVENOUS; SUBCUTANEOUS
Status: DISCONTINUED | OUTPATIENT
Start: 2019-08-01 | End: 2019-08-01 | Stop reason: HOSPADM

## 2019-08-01 RX ORDER — HYDROMORPHONE HCL/0.9% NACL/PF 0.2MG/0.2
0.2 SYRINGE (ML) INTRAVENOUS ONCE
Status: COMPLETED | OUTPATIENT
Start: 2019-08-01 | End: 2019-08-01

## 2019-08-01 RX ORDER — HYDROMORPHONE HYDROCHLORIDE 1 MG/ML
0.5 INJECTION, SOLUTION INTRAMUSCULAR; INTRAVENOUS; SUBCUTANEOUS ONCE
Status: COMPLETED | OUTPATIENT
Start: 2019-08-01 | End: 2019-08-01

## 2019-08-01 RX ORDER — CIPROFLOXACIN 500 MG/1
500 TABLET, FILM COATED ORAL ONCE
Status: COMPLETED | OUTPATIENT
Start: 2019-08-01 | End: 2019-08-01

## 2019-08-01 RX ADMIN — CIPROFLOXACIN HYDROCHLORIDE 500 MG: 500 TABLET, FILM COATED ORAL at 13:51

## 2019-08-01 RX ADMIN — HYDROMORPHONE HYDROCHLORIDE 0.5 MG: 1 INJECTION, SOLUTION INTRAMUSCULAR; INTRAVENOUS; SUBCUTANEOUS at 13:09

## 2019-08-01 RX ADMIN — Medication 0.2 MG: at 11:04

## 2019-08-01 RX ADMIN — SODIUM CHLORIDE 1000 ML: 9 INJECTION, SOLUTION INTRAVENOUS at 11:05

## 2019-08-01 RX ADMIN — HYDROMORPHONE HYDROCHLORIDE 0.5 MG: 1 INJECTION, SOLUTION INTRAMUSCULAR; INTRAVENOUS; SUBCUTANEOUS at 11:45

## 2019-08-01 NOTE — ED PROVIDER NOTES
[facility] [place] EMERGENCY DEPARTMENT ([facility])     August 1, 2019    History     Chief Complaint   Patient presents with     Bladder Problems     pt had urology surgery 7/25. pt is having a hard time voinding, last void was small amount one hour ago with heavy clotting.     HPI  Sidney Preciado is a 69 year old male who presents to the ED for evaluation of urinary urgency with inability to void. Patient reports his voiding seemed to be okay after his prostate surgery on 7/25/19. He states he had a few blood clots in his urine. He reports this morning he was unable to void and he is experiencing pain. Patient states he was able to void piror to arrival, but only blood came out. He denies any other symptoms.     Per chart review, patient was hospitalized at [facility] from 7/25/19 to 7/26/19 for holmium laser enucleation of the prostate. Patient recovered as anticipated and experienced no post-operative complications.     PAST MEDICAL HISTORY  Past Medical History:   Diagnosis Date     BPH (benign prostatic hyperplasia)      Hypermetropia, bilateral      PAST SURGICAL HISTORY  Past Surgical History:   Procedure Laterality Date     LASER HOLMIUM ENUCLEATION PROSTATE N/A 7/25/2019    Procedure: Holmium Laser Enucleation of the Prostate;  Surgeon: Sidney Carroll MD;  Location: [facility] OR     PROSTATE SURGERY       FAMILY HISTORY  Family History   Problem Relation Age of Onset     Diabetes No family hx of      Glaucoma No family hx of      Macular Degeneration No family hx of      SOCIAL HISTORY  Social History     Tobacco Use     Smoking status: Never Smoker     Smokeless tobacco: Never Used   Substance Use Topics     Alcohol use: Yes     Alcohol/week: 0.0 oz     Comment: can of beer/day     MEDICATIONS  Current Facility-Administered Medications   Medication     ciprofloxacin (CIPRO) tablet 500 mg     HYDROmorphone (PF) (DILAUDID) injection 0.5 mg     Current Outpatient Medications   Medication     multivitamin,  therapeutic (THERA-VIT) TABS     tamsulosin (FLOMAX) 0.4 MG capsule     diazepam (VALIUM) 10 MG tablet     omeprazole (PRILOSEC) 40 MG capsule     oxybutynin ER (DITROPAN-XL) 5 MG 24 hr tablet     trospium (SANCTURA XR) 60 MG CP24     Zolpidem Tartrate (AMBIEN PO)     ALLERGIES  No Known Allergies      I have reviewed the Medications, Allergies, Past Medical and Surgical History, and Social History in the Epic system.    Review of Systems     ROS: 14 point ROS neg other than the symptoms noted above in the HPI.      Physical Exam   BP: 113/70  Pulse: 55  Temp: 97.1  F (36.2  C)  Resp: 16  Weight: 81.6 kg (180 lb)  SpO2: 99 %      Physical Exam   Constitutional: No distress.   HENT:   Head: Atraumatic.   Mouth/Throat: Oropharynx is clear and moist.   Eyes: Pupils are equal, round, and reactive to light. No scleral icterus.   Cardiovascular: Normal heart sounds and intact distal pulses.   Pulmonary/Chest: Breath sounds normal. No respiratory distress.   Abdominal: Soft. Bowel sounds are normal. There is tenderness in the suprapubic area. There is no rigidity, no rebound and no guarding.   Genitourinary: Testes normal and penis normal. Circumcised.   Genitourinary Comments: Postvoid residual 150 cc on my initial check.  Patient passed several large clots later.  Second post void residual check 175.   Musculoskeletal: He exhibits no edema or tenderness.   Skin: Skin is warm. No rash noted. He is not diaphoretic.       ED Course        Procedures             Critical Care time:  none             Labs Ordered and Resulted from Time of ED Arrival Up to the Time of Departure from the ED   ROUTINE UA WITH MICROSCOPIC REFLEX TO CULTURE - Abnormal; Notable for the following components:       Result Value    Blood Urine Moderate (*)     pH Urine 8.5 (*)     Protein Albumin Urine 300 (*)     Leukocyte Esterase Urine Small (*)     WBC Urine 4,954 (*)     RBC Urine >182 (*)     Amorphous Crystals Moderate (*)     All other  components within normal limits   CBC WITH PLATELETS DIFFERENTIAL   BASIC METABOLIC PANEL   URINE CULTURE AEROBIC BACTERIAL            Assessments & Plan (with Medical Decision Making)   Patient presenting with urinary urgency, gross hematuria, suprapubic discomfort, proximally 1 week after prostate surgery.  On exam he has normal vitals appears slightly uncomfortable.  He has suprapubic fullness and tenderness but no peritoneal signs.  His genitourinary exam is otherwise normal.  His postvoid residual was 150 cc.  He did attempt to urinate several times and passed a small amount of urine and some large clots.  My second postvoid residual check is 175.  His creatinine and hemoglobin are normal.  Urology was consulted and came to the emergency department.  They irrigated the patient's bladder with great success.  He is now asymptomatic and voiding freely.  He tolerated the procedure well.  He was given a single dose of oral ciprofloxacin and will be discharged to outpatient urology follow-up.  We discussed the indications for emergency department return and follow-up.  Stable for discharge.      I have reviewed the nursing notes.    I have reviewed the findings, diagnosis, plan and need for follow up with the patient.       Medication List      There are no discharge medications for this visit.         Final diagnoses:   Urinary retention with incomplete bladder emptying   Gross hematuria     IDea, am serving as a trained medical scribe to document services personally performed by Stevan Cadet MD, based on the provider's statements to me.      IStevan MD, was physically present and have reviewed and verified the accuracy of this note documented by Dea Doss.     8/1/2019   [facility], [place], EMERGENCY DEPARTMENT     Stevan Cadet MD  08/01/19 [id number]

## 2019-08-01 NOTE — TELEPHONE ENCOUNTER
Pt w/ hx Lower Urinary Tract Symptoms  BPH (benign prostatic hyperplasia)  7/25/19  Holmium Laser Enucleation of the Prostate w/ Sidney Carroll MD. Pt calls red flag triage reporting urinary obstruction  this morning. Voided last night w/ red urine. Pain 8/10 while voiding. This morning unable to void. Spoke w/ Kaya urology triage RN. No openings in clinic, no clinic hrs today for DR Carroll. Advised [facility]. Pt agrees.    [facility] tele protocol  Post op problems  Pg [phone].

## 2019-08-01 NOTE — TELEPHONE ENCOUNTER
[facility] Call [facility]    Phone Message    May a detailed message be left on voicemail: yes    Reason for Call: Symptoms or Concerns     If patient has red-flag symptoms, warm transfer to triage line    Current symptom or concern: Pt complains of extreme pain and says he needs to be recast.  Pt requests call back ASAP    Symptoms have been present for:  1 day(s)    Call ASAP      Action Taken: Message routed to:  [facility] ([facility] urology

## 2019-08-01 NOTE — TELEPHONE ENCOUNTER
Patient is currently at the ED, he will get direction from ED doctor.    Kaya Macedo, RN, BSN  Care Coordinator- Reconstructive Urology

## 2019-08-01 NOTE — ED AVS SNAPSHOT
[facility], [place], Emergency Department  [address]  [place] [address]  Phone:  [phone]  Fax:  [phone]                                    Sidney Preciado   MRN: [id number]    Department:  [facility], Emergency Department   Date of Visit:  8/1/2019           After Visit Summary Signature Page    I have received my discharge instructions, and my questions have been answered. I have discussed any challenges I see with this plan with the nurse or doctor.    ..........................................................................................................................................  Patient/Patient Representative Signature      ..........................................................................................................................................  Patient Representative Print Name and Relationship to Patient    ..................................................               ................................................  Date                                   Time    ..........................................................................................................................................  Reviewed by Signature/Title    ...................................................              ..............................................  Date                                               Time          22EPIC Rev 08/18

## 2019-08-01 NOTE — CONSULTS
Urology Consult    Name: Sidney Preciado    MRN: [id number]   YOB: 1950               Chief Complaint:     Hematuria with clots, urinary frequency  History is obtained from the patient and chart review          History of Present Illness:     Sidney Preciado is a 69 year old male with history of bothersome lower urinary tract symptoms associated with an enlarged prostate.  He underwent HoLEP 7/25/19 and had done well post op.  Yesterday, however, perhaps after a long walk with his dogs, he did begin having gross hematuria and passage of some blood clots.  Today, he felt he couldn't completely empty his bladder and presented to the ED.          Past Medical History:     Past Medical History:   Diagnosis Date     BPH (benign prostatic hyperplasia)      Hypermetropia, bilateral             Past Surgical History:     Past Surgical History:   Procedure Laterality Date     LASER HOLMIUM ENUCLEATION PROSTATE N/A 7/25/2019    Procedure: Holmium Laser Enucleation of the Prostate;  Surgeon: Sidney Carroll MD;  Location: [facility] OR     PROSTATE SURGERY              Social History:     Social History     Tobacco Use     Smoking status: Never Smoker     Smokeless tobacco: Never Used   Substance Use Topics     Alcohol use: Yes     Alcohol/week: 0.0 oz     Comment: can of beer/day            Family History:     Family History   Problem Relation Age of Onset     Diabetes No family hx of      Glaucoma No family hx of      Macular Degeneration No family hx of             Allergies:   No Known Allergies         Medications:     No current facility-administered medications for this encounter.      Current Outpatient Medications   Medication Sig     multivitamin, therapeutic (THERA-VIT) TABS Take 1 tablet by mouth daily Reported on 4/26/2017     tamsulosin (FLOMAX) 0.4 MG capsule Take 1 capsule (0.4 mg) by mouth daily (Patient taking differently: Take 0.4 mg by mouth At Bedtime )     diazepam (VALIUM) 10 MG tablet Take 1  tablet (10 mg) by mouth every 6 hours as needed for anxiety or sleep Take 30-60 minutes before procedure.  Do not operate a vehicle after taking this medication. (Patient not taking: Reported on 4/26/2017)     omeprazole (PRILOSEC) 40 MG capsule Take 40 mg by mouth Reported on 4/26/2017     oxybutynin ER (DITROPAN-XL) 5 MG 24 hr tablet Take 1 tablet (5 mg) by mouth daily     trospium (SANCTURA XR) 60 MG CP24 Take 1 capsule (60 mg) by mouth every morning (Patient not taking: Reported on 7/18/2018)     Zolpidem Tartrate (AMBIEN PO) Reported on 4/26/2017             Review of Systems:    ROS: 10 point ROS neg other than the symptoms noted above in the HPI           Physical Exam:   VS:  T: 97.1    HR: 68    BP: 113/70    RR: 16   GEN:  AOx3.  NAD.    CV:  RRR  LUNGS: Non-labored breathing.   BACK:  No midline or CVA tenderness.  ABD:  Soft.  NT.  ND.  No rebound or guarding.  No masses.  :  circumcised.  Normal penile shaft.  Testicles descended bilaterally, no nodules or tenderness.  No inguinal hernias.  EXT:  Warm, well perfused.  No edema.  SKIN:  Warm.  Dry.  No rashes.  NEURO:  CN grossly intact.            Data:   All laboratory data reviewed:    Recent Labs   Lab 08/01/19  1042   WBC 5.6   HGB 14.9        Recent Labs   Lab 08/01/19  1042      POTASSIUM 4.0   CHLORIDE 107   CO2 27   BUN 21   CR 0.96   GLC 92   COSMO 9.0     Recent Labs   Lab 08/01/19  1109   COLOR Red   APPEARANCE Cloudy   URINEGLC Negative   URINEBILI Negative   URINEKETONE Negative   SG 1.020   URINEPH 8.5*   PROTEIN 300*   NITRITE Negative   LEUKEST Small*   RBCU >182*   WBCU 4,954*            Impression and Plan:   Impression:   Sidney Preciado is a 69 year old male with history of lower urinary tract symptoms secondary to enlarged prostate, 1 week out from HoLEP. Who presented with clot retention.   Procedure:  Using stand sterile technique, the bladder was irrigated with a 22Fr 6 hole catheter, with immediate 300cc return of  dark merlot urine.  Irrigation of the prostatic fossa returned approximately 200 ml of clot burden.  He was irrigated to a light peach and the irrgation catheter was removed.    Trial of void performed, with residual volume of 17cc.    Plan:     - May discharge home without catheter.  - Please provide 1 dose 500mg ciprofloxacin prior to discharge    - Follow up Urology as scheduled                 This patient's exam findings, labs, and imaging discussed with urology staff surgeon Dr. Carroll, who developed the treatment plan.    Isaac Delcid, Uro-3  Urology Resident

## 2019-08-01 NOTE — DISCHARGE INSTRUCTIONS
Thank you for choosing [facility].     Please closely monitor for further symptoms. Return to the Emergency Department if you develop any new or worsening signs or symptoms.    If you received any opiate pain medications or sedatives during your visit, please do not drive for at least 8 hours.     Labs, cultures or final xray interpretations may still need to be reviewed.  We will call you if your plan of care needs to be changed.    Please follow up with your scheduled urology outpatient follow-up.  Stay well-hydrated and void frequently.

## 2019-08-02 LAB
BACTERIA SPEC CULT: NO GROWTH
SPECIMEN SOURCE: NORMAL

## 2019-08-26 PROBLEM — F41.9 ANXIETY: Status: ACTIVE | Noted: 2018-05-21

## 2019-08-26 PROBLEM — G47.00 INSOMNIA: Status: ACTIVE | Noted: 2018-05-21

## 2019-08-26 PROBLEM — Z85.46 HISTORY OF MALIGNANT NEOPLASM OF PROSTATE: Status: ACTIVE | Noted: 2018-04-02

## 2019-08-27 ENCOUNTER — PRE VISIT (OUTPATIENT)
Dept: UROLOGY | Facility: CLINIC | Age: 69
End: 2019-08-27

## 2019-08-27 NOTE — TELEPHONE ENCOUNTER
Reason for Visit: Post-op follow up    Holmium laser enucleation of the prostate    Diagnosis: BPH with LUTS    Orders/Procedures/Records: Records available, surgery (7/25)    Contact Patient: N/A    Rooming Requirements: UA dip/PVR      Sara Dasilva  08/27/19  12:59 PM

## 2019-09-17 ENCOUNTER — OFFICE VISIT (OUTPATIENT)
Dept: UROLOGY | Facility: CLINIC | Age: 69
End: 2019-09-17
Payer: COMMERCIAL

## 2019-09-17 VITALS
HEART RATE: 62 BPM | HEIGHT: 75 IN | DIASTOLIC BLOOD PRESSURE: 70 MMHG | BODY MASS INDEX: 22.38 KG/M2 | SYSTOLIC BLOOD PRESSURE: 117 MMHG | WEIGHT: 180 LBS

## 2019-09-17 DIAGNOSIS — N40.1 BENIGN PROSTATIC HYPERPLASIA WITH URINARY OBSTRUCTION: Primary | ICD-10-CM

## 2019-09-17 DIAGNOSIS — N13.8 BENIGN PROSTATIC HYPERPLASIA WITH URINARY OBSTRUCTION: Primary | ICD-10-CM

## 2019-09-17 LAB
ALBUMIN UR-MCNC: NEGATIVE MG/DL
APPEARANCE UR: CLEAR
APPEARANCE UR: CLEAR
BACTERIA #/AREA URNS HPF: ABNORMAL /HPF
BILIRUB UR QL STRIP: NEGATIVE
BILIRUB UR QL: NORMAL
COLOR UR AUTO: ABNORMAL
COLOR UR: YELLOW
GLUCOSE UR STRIP-MCNC: NEGATIVE MG/DL
GLUCOSE URINE: NORMAL MG/DL
HGB UR QL STRIP: NEGATIVE
HGB UR QL: NORMAL
KETONES UR QL: NORMAL MG/DL
KETONES UR STRIP-MCNC: NEGATIVE MG/DL
LEUKOCYTE ESTERASE UR QL STRIP: NEGATIVE
LEUKOCYTE ESTERASE URINE: NORMAL
NITRATE UR QL: NEGATIVE
NITRITE UR QL STRIP: NORMAL
PH UR STRIP: 7 PH (ref 5–7)
PH UR STRIP: 7 PH (ref 5–7)
PROTEIN ALBUMIN URINE: NORMAL MG/DL
RBC #/AREA URNS AUTO: 0 /HPF (ref 0–2)
SOURCE: ABNORMAL
SOURCE: NORMAL
SP GR UR STRIP: 1 (ref 1–1.03)
SP GR UR STRIP: 1 (ref 1–1.03)
UROBILINOGEN UR QL STRIP: 0.2 EU/DL (ref 0.2–1)
UROBILINOGEN UR STRIP-MCNC: 0 MG/DL (ref 0–2)
WBC #/AREA URNS AUTO: 0 /HPF (ref 0–5)

## 2019-09-17 RX ORDER — OXYBUTYNIN CHLORIDE 5 MG/1
5 TABLET, EXTENDED RELEASE ORAL DAILY
Qty: 30 TABLET | Refills: 1 | Status: SHIPPED | OUTPATIENT
Start: 2019-09-17 | End: 2020-07-21

## 2019-09-17 ASSESSMENT — MIFFLIN-ST. JEOR: SCORE: 1677.1

## 2019-09-17 ASSESSMENT — PAIN SCALES - GENERAL: PAINLEVEL: NO PAIN (0)

## 2019-09-17 NOTE — LETTER
"2019       RE: Sidney Preciado  [address] [facility] [address]     Dear Colleague,    Thank you for referring your patient, Sidney Preciado, to the [facility] FOR PROSTATE AND UROLOGIC CANCERS at [facility]. Please see a copy of my visit note below.      Urology Clinic    Sidney Carroll MD  Date of Service: 2019     Name: Sidney Preciado  MRN: [id number]  Age: 69 year old  : 1950  Referring provider: Josselyn Ramirez [facility]     Assessment and Plan:  Assessment:  Sidney Preciado is a 69 year old male with history of BPH s/p HoLEP on 2019. Stream is significantly improved, bladder pain is resolved, he is having only minor leakage, but he continues to have some frequency.     Plan:    Oxybutynin can be used for bladder spasm and overactivity.     Repeat PSA at his convenience in the near future.     Follow up over the phone in 6-8 weeks.   ______________________________________________________________________    HPI  Sidney Preciado is a 69 year old male with a history of BPH s/p HoLEP on 2019.     24 gms of tissue were removed.  Pathology was benign.     Today he notes that stream is significantly improved. Bladder pressure/pain is resolved. He continues to have frequency. He is only having minor leakage and is not using Depends. He has noticed retrograde ejaculation.     He was seen on the ER on 2019 for hematuria and urinary retention but this has since resolved without any further blood in the urine.     AUA Symptom Score is 14/35    Urinary Flow Rate  Residual Volume by Ultrasound: 40 mL    Review of Systems:   Pertinent items are noted in HPI or as below, remainder of complete ROS is negative.      Physical Exam:   Patient is a 69 year old  male   Vitals: Blood pressure 117/70, pulse 62, height 1.905 m (6' 3\"), weight 81.6 kg (180 lb).  Notable Findings on Exam: Well-nourished male in no apparent distress "     Laboratory:   I personally reviewed all applicable laboratory data and went over findings with patient  Significant for:    CBC RESULTS:  Recent Labs   Lab Test 08/01/19  1042   WBC 5.6   HGB 14.9           BMP RESULTS:  Recent Labs   Lab Test 08/01/19  1042 07/25/19  0737     --    POTASSIUM 4.0  --    CHLORIDE 107  --    CO2 27  --    ANIONGAP 6  --    GLC 92 92   BUN 21  --    CR 0.96  --    GFRESTIMATED 82  --    GFRESTBLACK >90  --    COSMO 9.0  --        UA RESULTS:   Recent Labs   Lab Test 08/01/19  1109 06/18/19  1315 06/18/19 05/30/19  1650   SG 1.020 1.012 1.015 1.010   URINEPH 8.5* 6.0 6.5 7.0   NITRITE Negative Negative Neg Negative   RBCU >182* 2  --  <1   WBCU 4,954* <1  --  1       CALCIUM RESULTS  Lab Results   Component Value Date    COSMO 9.0 08/01/2019       PSA RESULTS  PSA   Date Value Ref Range Status   04/26/2017 3.02 0 - 4 ug/L Final     Comment:     Assay Method:  Chemiluminescence using Siemens Vista analyzer       Imaging:   I personally reviewed all applicable imaging and went over the below findings with patient.    Results for orders placed or performed during the hospital encounter of 05/31/19   MR Prostate wo & w Contrast    Narrative    MRI PROSTATE: 5/31/2019 4:00 PM    CLINICAL HISTORY: prostate cancer; Benign prostatic hyperplasia with  urinary obstruction; Benign prostatic hyperplasia with urinary  obstruction     Most Recent PSA: 3.02 ug/L    Comparison: MRI 8/25/2015.    TECHNIQUE:  The following sequences were obtained: High-resolution axial  T2-weighted, coronal T2-weighted, 3D volumetric T2-weighted, axial  pre-contrast T1, axial diffusion-weighted, axial apparent diffusion  coefficient and axial dynamic contrast-enhanced T1. Postcontrast  images were evaluated on a separate workstation to evaluate dynamic  contrast enhancement. The technique of this exam is PI-RADS v2.1  compliant. Contrast dose: 7.5mL Gadavist    FINDINGS:  Size: 59 grams  Hemorrhage:  Mild  Peripheral zone: Heterogeneous on T2-weighted images. Regions of  mildly decreased signal on ADC or DWI which are best characterized as  PI-RADS 2 without highly suspicious lesion.  Transition zone: Enlarged with BPH changes. Transition zone nodules  which are circumscribed or mostly encapsulated without diffusion  restriction.  PI-RADS 2.  No highly suspicious nodules.    Neurovascular bundles: No suspicion of involvement by malignancy  Seminal vesicles: Not involved by tumor. No focal abnormality.  Lymph nodes: No adenopathy.  Bones: No suspicious lesions. Mild degenerative changes of the lower  lumbar spine.  Other pelvic organs: Sigmoid diverticulosis. Bladder wall thickening  and trabeculation are probably sequelae of chronic bladder outlet  narrowing.        Impression    IMPRESSION: Based on the most suspicious abnormality, this exam is  characterized as PIRADS 2 - Clinically significant cancer is unlikely  to be present.      PIRADS? v2.1 Assessment Categories   PIRADS 1: Very low (clinically significant cancer is highly unlikely  to be present)   PIRADS 2: Low (clinically significant cancer is unlikely to be  present)   PIRADS 3: Intermediate (the presence of clinically significant cancer  is equivocal)   PIRADS 4: High (clinically significant cancer is likely to be present)    PIRADS 5: Very high (clinically significant cancer is highly likely to  be present)    I have personally reviewed the examination and initial interpretation  and I agree with the findings.    KATINA OLSON MD       Scribe Disclosure:  I, Valerie Sanabria, am serving as a scribe to document services personally performed by Sidney Carroll MD at this visit, based upon the provider's statements to me. All documentation has been reviewed by the aforementioned provider prior to being entered into the official medical record.      Again, thank you for allowing me to participate in the care of your patient.       Sincerely,    Sidney Carroll MD

## 2019-09-17 NOTE — PROGRESS NOTES
"  Urology Clinic    Sidney Carroll MD  Date of Service: 2019     Name: Sidney Preciado  MRN: [id number]  Age: 69 year old  : 1950  Referring provider: Josselyn Ramirez [facility]     Assessment and Plan:  Assessment:  Sidney Preciado is a 69 year old male with history of BPH s/p HoLEP on 2019. Stream is significantly improved, bladder pain is resolved, he is having only minor leakage, but he continues to have some frequency.     Plan:    Oxybutynin can be used for bladder spasm and overactivity.     Repeat PSA at his convenience in the near future.     Follow up over the phone in 6-8 weeks.   ______________________________________________________________________    HPI  Sidney Preciado is a 69 year old male with a history of BPH s/p HoLEP on 2019.     24 gms of tissue were removed.  Pathology was benign.     Today he notes that stream is significantly improved. Bladder pressure/pain is resolved. He continues to have frequency. He is only having minor leakage and is not using Depends. He has noticed retrograde ejaculation.     He was seen on the ER on 2019 for hematuria and urinary retention but this has since resolved without any further blood in the urine.     AUA Symptom Score is 14/35    Urinary Flow Rate  Residual Volume by Ultrasound: 40 mL    Review of Systems:   Pertinent items are noted in HPI or as below, remainder of complete ROS is negative.      Physical Exam:   Patient is a 69 year old  male   Vitals: Blood pressure 117/70, pulse 62, height 1.905 m (6' 3\"), weight 81.6 kg (180 lb).  Notable Findings on Exam: Well-nourished male in no apparent distress     Laboratory:   I personally reviewed all applicable laboratory data and went over findings with patient  Significant for:    CBC RESULTS:  Recent Labs   Lab Test 19  1042   WBC 5.6   HGB 14.9           BMP RESULTS:  Recent Labs   Lab Test 19  1042 19  0737     --    POTASSIUM 4.0  --  "   CHLORIDE 107  --    CO2 27  --    ANIONGAP 6  --    GLC 92 92   BUN 21  --    CR 0.96  --    GFRESTIMATED 82  --    GFRESTBLACK >90  --    COSMO 9.0  --        UA RESULTS:   Recent Labs   Lab Test 08/01/19  1109 06/18/19  1315 06/18/19 05/30/19  1650   SG 1.020 1.012 1.015 1.010   URINEPH 8.5* 6.0 6.5 7.0   NITRITE Negative Negative Neg Negative   RBCU >182* 2  --  <1   WBCU 4,954* <1  --  1       CALCIUM RESULTS  Lab Results   Component Value Date    COSMO 9.0 08/01/2019       PSA RESULTS  PSA   Date Value Ref Range Status   04/26/2017 3.02 0 - 4 ug/L Final     Comment:     Assay Method:  Chemiluminescence using Siemens Vista analyzer       Imaging:   I personally reviewed all applicable imaging and went over the below findings with patient.    Results for orders placed or performed during the hospital encounter of 05/31/19   MR Prostate wo & w Contrast    Narrative    MRI PROSTATE: 5/31/2019 4:00 PM    CLINICAL HISTORY: prostate cancer; Benign prostatic hyperplasia with  urinary obstruction; Benign prostatic hyperplasia with urinary  obstruction     Most Recent PSA: 3.02 ug/L    Comparison: MRI 8/25/2015.    TECHNIQUE:  The following sequences were obtained: High-resolution axial  T2-weighted, coronal T2-weighted, 3D volumetric T2-weighted, axial  pre-contrast T1, axial diffusion-weighted, axial apparent diffusion  coefficient and axial dynamic contrast-enhanced T1. Postcontrast  images were evaluated on a separate workstation to evaluate dynamic  contrast enhancement. The technique of this exam is PI-RADS v2.1  compliant. Contrast dose: 7.5mL Gadavist    FINDINGS:  Size: 59 grams  Hemorrhage: Mild  Peripheral zone: Heterogeneous on T2-weighted images. Regions of  mildly decreased signal on ADC or DWI which are best characterized as  PI-RADS 2 without highly suspicious lesion.  Transition zone: Enlarged with BPH changes. Transition zone nodules  which are circumscribed or mostly encapsulated without  diffusion  restriction.  PI-RADS 2.  No highly suspicious nodules.    Neurovascular bundles: No suspicion of involvement by malignancy  Seminal vesicles: Not involved by tumor. No focal abnormality.  Lymph nodes: No adenopathy.  Bones: No suspicious lesions. Mild degenerative changes of the lower  lumbar spine.  Other pelvic organs: Sigmoid diverticulosis. Bladder wall thickening  and trabeculation are probably sequelae of chronic bladder outlet  narrowing.        Impression    IMPRESSION: Based on the most suspicious abnormality, this exam is  characterized as PIRADS 2 - Clinically significant cancer is unlikely  to be present.      PIRADS? v2.1 Assessment Categories   PIRADS 1: Very low (clinically significant cancer is highly unlikely  to be present)   PIRADS 2: Low (clinically significant cancer is unlikely to be  present)   PIRADS 3: Intermediate (the presence of clinically significant cancer  is equivocal)   PIRADS 4: High (clinically significant cancer is likely to be present)    PIRADS 5: Very high (clinically significant cancer is highly likely to  be present)    I have personally reviewed the examination and initial interpretation  and I agree with the findings.    KATINA OLSON MD       Scribe Disclosure:  I, Valerie Sanabria, am serving as a scribe to document services personally performed by Sidney Carroll MD at this visit, based upon the provider's statements to me. All documentation has been reviewed by the aforementioned provider prior to being entered into the official medical record.

## 2019-09-17 NOTE — NURSING NOTE
"Chief Complaint   Patient presents with     Follow Up     Post-op fu, HoLEP       Blood pressure 117/70, pulse 62, height 1.905 m (6' 3\"), weight 81.6 kg (180 lb). Body mass index is 22.5 kg/m .    Patient Active Problem List   Diagnosis     BPH (benign prostatic hyperplasia)     Malignant neoplasm of prostate (H)     Acquired hallux malleus     Anxiety     Chronic hepatitis C virus infection (H)     Controlled substance agreement signed     Disorder of skin or subcutaneous tissue     Diverticulosis     General medical exam     History of malignant neoplasm of prostate     Hypercholesterolemia     Insomnia     Osteoarthritis     Other long term (current) drug therapy     Skin lesion       No Known Allergies    Current Outpatient Medications   Medication Sig Dispense Refill     multivitamin, therapeutic (THERA-VIT) TABS Take 1 tablet by mouth daily Reported on 4/26/2017       omeprazole (PRILOSEC) 40 MG capsule Take 40 mg by mouth Reported on 4/26/2017       Zolpidem Tartrate (AMBIEN PO) Reported on 4/26/2017       diazepam (VALIUM) 10 MG tablet Take 1 tablet (10 mg) by mouth every 6 hours as needed for anxiety or sleep Take 30-60 minutes before procedure.  Do not operate a vehicle after taking this medication. (Patient not taking: Reported on 4/26/2017) 1 tablet 0     oxybutynin ER (DITROPAN-XL) 5 MG 24 hr tablet Take 1 tablet (5 mg) by mouth daily (Patient not taking: Reported on 9/17/2019) 90 tablet 3     tamsulosin (FLOMAX) 0.4 MG capsule Take 1 capsule (0.4 mg) by mouth daily (Patient not taking: Reported on 9/17/2019) 30 capsule 3     trospium (SANCTURA XR) 60 MG CP24 Take 1 capsule (60 mg) by mouth every morning (Patient not taking: Reported on 7/18/2018) 90 each 0       Social History     Tobacco Use     Smoking status: Never Smoker     Smokeless tobacco: Never Used   Substance Use Topics     Alcohol use: Yes     Alcohol/week: 0.0 oz     Comment: can of beer/day     Drug use: Never       Sara Dasilva" EMT  9/17/2019  1:13 PM

## 2019-09-17 NOTE — PATIENT INSTRUCTIONS
Please schedule an 8 week phone visit with Dr. Carroll and please have your PSA drawn between now and your phone appointment!    It was a pleasure meeting with you today.  Thank you for allowing me and my team the privilege of caring for you today.  YOU are the reason we are here, and I truly hope we provided you with the excellent service you deserve.  Please let us know if there is anything else we can do for you so that we can be sure you are leaving completely satisfied with your care experience.

## 2019-10-08 ENCOUNTER — TELEPHONE (OUTPATIENT)
Dept: UROLOGY | Facility: CLINIC | Age: 69
End: 2019-10-08

## 2019-10-08 NOTE — TELEPHONE ENCOUNTER
[facility] Call [facility]    Phone Message    May a detailed message be left on voicemail: yes    Reason for Call: Other: Pt called and would like to reschedule telephone appointment for sometime in Jan or Feb. Please call back pt. Thanks.     Action Taken: Message routed to:  [facility] URO

## 2019-10-23 ENCOUNTER — PRE VISIT (OUTPATIENT)
Dept: UROLOGY | Facility: CLINIC | Age: 69
End: 2019-10-23

## 2019-10-23 NOTE — TELEPHONE ENCOUNTER
Reason for Visit: Televisit, PSA prior    Diagnosis: BPH with urinary obstruction    Orders/Procedures/Records: Records available    Contact Patient: N/A    Rooming Requirements: Televisit, check pt in       Sara Dasilva  10/23/19  12:00 PM

## 2019-11-06 ENCOUNTER — HEALTH MAINTENANCE LETTER (OUTPATIENT)
Age: 69
End: 2019-11-06

## 2019-11-12 ENCOUNTER — VIRTUAL VISIT (OUTPATIENT)
Dept: UROLOGY | Facility: CLINIC | Age: 69
End: 2019-11-12
Payer: COMMERCIAL

## 2019-11-12 DIAGNOSIS — R39.9 LOWER URINARY TRACT SYMPTOMS: Primary | ICD-10-CM

## 2019-11-12 NOTE — PROGRESS NOTES
Telephone Follow-Up     Patient opted to conduct today's return visit via telephone vs an in person visit to the clinic.    I spoke with: Sidney Preciado    The reason for the telephone visit was: to follow-up on urinary symptoms post HoLEP    Pertinent history and review of systems: Continues to describe sense of urgency, particularly bothersome at night with small volume voids.  Flow is good when his bladder is full.  Tried ditropan without relief.  Notes that symptoms are more bothersome now than they were initially after surgery during first several weeks.    Assessment: Suspect possible baseline detrusor instability vs. stricture/contracture of bladder neck s/p HoLEP.       Advice/instructions given to patient/guardian including prescriptions, follow up appointment or orders for diagnostic testing: Will plan to bring him back next available for cystoscopy to assess in more detail.    Phone call contact time    Call Started at: 8:07    Call Ended at: 8:17

## 2019-11-13 ENCOUNTER — TELEPHONE (OUTPATIENT)
Dept: UROLOGY | Facility: CLINIC | Age: 69
End: 2019-11-13

## 2019-11-13 NOTE — TELEPHONE ENCOUNTER
----- Message from Sandra Aguirre RN sent at 11/12/2019  2:06 PM CST -----  See below~ Can you please help him get set up? THX   ----- Message -----  From: Sidney Carroll MD  Sent: 11/12/2019   1:48 PM CST  To: Sandra Aguirre RN    Hi - Can we bring Ayan in for a cystoscopy next available, thank you

## 2019-11-26 DIAGNOSIS — N40.1 BENIGN PROSTATIC HYPERPLASIA WITH URINARY OBSTRUCTION: ICD-10-CM

## 2019-11-26 DIAGNOSIS — N13.8 BENIGN PROSTATIC HYPERPLASIA WITH URINARY OBSTRUCTION: ICD-10-CM

## 2019-11-26 RX ORDER — OXYBUTYNIN CHLORIDE 5 MG/1
5 TABLET, EXTENDED RELEASE ORAL DAILY
Qty: 30 TABLET | Refills: 1 | Status: CANCELLED | OUTPATIENT
Start: 2019-11-26

## 2019-11-26 RX ORDER — OXYBUTYNIN CHLORIDE 5 MG/1
5 TABLET, EXTENDED RELEASE ORAL DAILY
Qty: 90 TABLET | Refills: 2 | Status: SHIPPED | OUTPATIENT
Start: 2019-11-26 | End: 2020-07-21

## 2019-11-26 NOTE — TELEPHONE ENCOUNTER
Re-sending order from 7/11/19 per pharmacy request-refills adjusted.  They see the order but it was inactivated.

## 2019-12-23 ENCOUNTER — PRE VISIT (OUTPATIENT)
Dept: UROLOGY | Facility: CLINIC | Age: 69
End: 2019-12-23

## 2019-12-23 NOTE — TELEPHONE ENCOUNTER
Reason for Visit: Cystoscopy    Diagnosis: LUTS    Orders/Procedures/Records: Records available    Contact Patient: N/A    Rooming Requirements: Cystoscopy, collect urine      Sara Dasilva  12/23/19  11:35 AM

## 2020-01-07 ENCOUNTER — OFFICE VISIT (OUTPATIENT)
Dept: UROLOGY | Facility: CLINIC | Age: 70
End: 2020-01-07
Payer: COMMERCIAL

## 2020-01-07 VITALS
HEIGHT: 75 IN | HEART RATE: 95 BPM | SYSTOLIC BLOOD PRESSURE: 115 MMHG | DIASTOLIC BLOOD PRESSURE: 67 MMHG | WEIGHT: 180 LBS | BODY MASS INDEX: 22.38 KG/M2

## 2020-01-07 DIAGNOSIS — R39.9 LOWER URINARY TRACT SYMPTOMS: ICD-10-CM

## 2020-01-07 DIAGNOSIS — N40.1 BENIGN PROSTATIC HYPERPLASIA WITH URINARY OBSTRUCTION: Primary | ICD-10-CM

## 2020-01-07 DIAGNOSIS — N13.8 BENIGN PROSTATIC HYPERPLASIA WITH URINARY OBSTRUCTION: Primary | ICD-10-CM

## 2020-01-07 RX ORDER — LIDOCAINE HYDROCHLORIDE 20 MG/ML
JELLY TOPICAL ONCE
Status: COMPLETED | OUTPATIENT
Start: 2020-01-07 | End: 2020-01-07

## 2020-01-07 RX ADMIN — LIDOCAINE HYDROCHLORIDE: 20 JELLY TOPICAL at 13:25

## 2020-01-07 ASSESSMENT — MIFFLIN-ST. JEOR: SCORE: 1677.1

## 2020-01-07 ASSESSMENT — PAIN SCALES - GENERAL: PAINLEVEL: NO PAIN (0)

## 2020-01-07 NOTE — NURSING NOTE
"Cysto-  S/p HoLEP  He states his stream is better and does have some nocturia and is emptying well.    Chief Complaint   Patient presents with     Cystoscopy     LUTS s/p HoLEP       Blood pressure 115/67, pulse 95, height 1.905 m (6' 3\"), weight 81.6 kg (180 lb). Body mass index is 22.5 kg/m .    Patient Active Problem List   Diagnosis     BPH (benign prostatic hyperplasia)     Malignant neoplasm of prostate (H)     Acquired hallux malleus     Anxiety     Chronic hepatitis C virus infection (H)     Controlled substance agreement signed     Disorder of skin or subcutaneous tissue     Diverticulosis     General medical exam     History of malignant neoplasm of prostate     Hypercholesterolemia     Insomnia     Osteoarthritis     Other long term (current) drug therapy     Skin lesion       No Known Allergies    Current Outpatient Medications   Medication Sig Dispense Refill     oxybutynin ER (DITROPAN-XL) 5 MG 24 hr tablet Take 1 tablet (5 mg) by mouth daily 30 tablet 1     diazepam (VALIUM) 10 MG tablet Take 1 tablet (10 mg) by mouth every 6 hours as needed for anxiety or sleep Take 30-60 minutes before procedure.  Do not operate a vehicle after taking this medication. (Patient not taking: Reported on 4/26/2017) 1 tablet 0     multivitamin, therapeutic (THERA-VIT) TABS Take 1 tablet by mouth daily Reported on 4/26/2017       omeprazole (PRILOSEC) 40 MG capsule Take 40 mg by mouth Reported on 4/26/2017       oxybutynin ER (DITROPAN-XL) 5 MG 24 hr tablet Take 1 tablet (5 mg) by mouth daily (Patient not taking: Reported on 1/7/2020) 90 tablet 2     tamsulosin (FLOMAX) 0.4 MG capsule Take 1 capsule (0.4 mg) by mouth daily (Patient not taking: Reported on 9/17/2019) 30 capsule 3     trospium (SANCTURA XR) 60 MG CP24 Take 1 capsule (60 mg) by mouth every morning (Patient not taking: Reported on 7/18/2018) 90 each 0     Zolpidem Tartrate (AMBIEN PO) Reported on 4/26/2017         Social History     Tobacco Use     Smoking " status: Never Smoker     Smokeless tobacco: Never Used   Substance Use Topics     Alcohol use: Yes     Alcohol/week: 0.0 standard drinks     Comment: can of beer/day     Drug use: Never       Invasive Procedure Safety Checklist:    Procedure: Cystoscopy    Action: Complete sections and checkboxes as appropriate.    Pre-procedure:  1. Patient ID Verified with 2 identifiers (Estela and  or MRN) : YES    2. Procedure and site verified with patient/designee (when able) : YES    3. Accurate consent documentation in medical record : YES    4. H&P (or appropriate assessment) documented in medical record : N/A  H&P must be up to 30 days prior to procedure an updated within 24 hours of                 Procedure as applicable.     5. Relevant diagnostic and radiology test results appropriately labeled and displayed as applicable : YES    6. Blood products, implants, devices, and/or special equipment available for the procedure as applicable : YES    7. Procedure site(s) marked with provider initials [Exclusions: none] : NO    8. Marking not required. Reason : Yes  Procedure does not require site marking    Time Out:     Time-Out performed immediately prior to starting procedure, including verbal and active participation of all team members addressing: YES    1. Correct patient identity.  2. Confirmed that the correct side and site are marked.  3. An accurate procedure to be done.  4. Agreement on the procedure to be done.  5. Correct patient position.  6. Relevant images and results are properly labeled and appropriately displayed.  7. The need to administer antibiotics or fluids for irrigation purposes during the procedure as applicable.  8. Safety precautions based on patient history or medication use.    During Procedure: Verification of correct person, site, and procedure occurs any time the responsibility for care of the patient is transferred to another member of the care team.    The following medication was given:      MEDICATION: Lidocaine Uro-Jet 2% 200mg (20mg/mL)  ROUTE: Urethral   SITE: Urethra   DOSE: 10mL  LOT #: [id number]  : [employer]   EXPIRATION DATE: 9-21  NDC#: [address]-09   Was there drug waste? No    Prior to injection, verified patient identity using patient's name and date of birth.  Due to injection administration, patient instructed to remain in clinic for 15 minutes  afterwards, and to report any adverse reaction to me immediately.    Drug Amount Wasted:  None.  Vial/Syringe: Single dose vial      BREANNE Slater  1/7/2020  1:21 PM

## 2020-01-07 NOTE — PROGRESS NOTES
Urology Clinic    Sidney Carroll MD  Date of Service: 2020     Name: Sidney Preciado  MRN: [id number]  Age: 69 year old  : 1950  Referring provider: Referred Self     Assessment and Plan:  Assessment:  Sidney Preciado  is a 69 year old male with history of BPH s/p HoLEP on 2019. Stream is improved. Post void dribble and nocturia with small void volumes is likely related to bladder irritability. Cystoscopy today is negative for any stricture.     Plan:    Increase ditropan to two tablets daily daily.     Follow up in 6 months.   ______________________________________________________________________    HPI  Sidney Preciado is a 69 year old male with a history of BPH s/p HoLEP on 2019.      24 gms of tissue were removed.  Pathology was benign.     After HoLEP stream improved and bladder pressure/pain resolved but has continued to have frequency and urgency, particularly bothersome at night with small volume voids, which has actually worsened compared to initially after surgery. He tried ditropan without relief. He presents today for cystoscopy. He reports that he continues to have nocturia with small voids. He also endorses post void dribbling. He notes that stream after HoLEP was very strong but has slowed down somewhat. Symptoms are tolerable.     AUA 12/35   QOL 3/6     Review of Systems:   Pertinent items are noted in HPI or as below, remainder of complete ROS is negative.      Cystoscopy:   After obtaining informed consent, the patient was prepped and draped in the standard sterile fashion.  The 15 [place] flexible cystoscope was inserted through the urethral meatus.      The anterior urethra was: normal without stricture.    The external sphincter was appropriately coapted.   The prostatic urethra demonstrated big, open HoLEP cavity.   The bladder neck was nonocclusive.    The bladder was unremarkable for tumors, erythema or stones.    The ureteral orifices were identified on each  side in orthotopic position with efflux of clear urine.   There were mild trabeculations.    On retroflexion there was the usual bladder neck hyperemia.    There was no intravesical protrusion of the prostate.      The patient tolerated the procedure well without complication.      Laboratory:   I personally reviewed all applicable laboratory data and went over findings with patient  Significant for:    CBC RESULTS:  Recent Labs   Lab Test 08/01/19  1042   WBC 5.6   HGB 14.9           BMP RESULTS:  Recent Labs   Lab Test 08/01/19  1042 07/25/19  0737     --    POTASSIUM 4.0  --    CHLORIDE 107  --    CO2 27  --    ANIONGAP 6  --    GLC 92 92   BUN 21  --    CR 0.96  --    GFRESTIMATED 82  --    GFRESTBLACK >90  --    COSMO 9.0  --        UA RESULTS:   Recent Labs   Lab Test 09/17/19  1328 09/17/19 08/01/19  1109 06/18/19  1315   SG 1.003 1.005 1.020 1.012   URINEPH 7.0 7.0 8.5* 6.0   NITRITE Negative Neg Negative Negative   RBCU 0  --  >182* 2   WBCU 0  --  4,954* <1       CALCIUM RESULTS  Lab Results   Component Value Date    COSMO 9.0 08/01/2019       PSA RESULTS  PSA   Date Value Ref Range Status   04/26/2017 3.02 0 - 4 ug/L Final     Comment:     Assay Method:  Chemiluminescence using Siemens Vista analyzer       Imaging:   I personally reviewed all applicable imaging and went over the below findings with patient.    Results for orders placed or performed during the hospital encounter of 05/31/19   MR Prostate wo & w Contrast    Narrative    MRI PROSTATE: 5/31/2019 4:00 PM    CLINICAL HISTORY: prostate cancer; Benign prostatic hyperplasia with  urinary obstruction; Benign prostatic hyperplasia with urinary  obstruction     Most Recent PSA: 3.02 ug/L    Comparison: MRI 8/25/2015.    TECHNIQUE:  The following sequences were obtained: High-resolution axial  T2-weighted, coronal T2-weighted, 3D volumetric T2-weighted, axial  pre-contrast T1, axial diffusion-weighted, axial apparent diffusion  coefficient  and axial dynamic contrast-enhanced T1. Postcontrast  images were evaluated on a separate workstation to evaluate dynamic  contrast enhancement. The technique of this exam is PI-RADS v2.1  compliant. Contrast dose: 7.5mL Gadavist    FINDINGS:  Size: 59 grams  Hemorrhage: Mild  Peripheral zone: Heterogeneous on T2-weighted images. Regions of  mildly decreased signal on ADC or DWI which are best characterized as  PI-RADS 2 without highly suspicious lesion.  Transition zone: Enlarged with BPH changes. Transition zone nodules  which are circumscribed or mostly encapsulated without diffusion  restriction.  PI-RADS 2.  No highly suspicious nodules.    Neurovascular bundles: No suspicion of involvement by malignancy  Seminal vesicles: Not involved by tumor. No focal abnormality.  Lymph nodes: No adenopathy.  Bones: No suspicious lesions. Mild degenerative changes of the lower  lumbar spine.  Other pelvic organs: Sigmoid diverticulosis. Bladder wall thickening  and trabeculation are probably sequelae of chronic bladder outlet  narrowing.        Impression    IMPRESSION: Based on the most suspicious abnormality, this exam is  characterized as PIRADS 2 - Clinically significant cancer is unlikely  to be present.      PIRADS? v2.1 Assessment Categories   PIRADS 1: Very low (clinically significant cancer is highly unlikely  to be present)   PIRADS 2: Low (clinically significant cancer is unlikely to be  present)   PIRADS 3: Intermediate (the presence of clinically significant cancer  is equivocal)   PIRADS 4: High (clinically significant cancer is likely to be present)    PIRADS 5: Very high (clinically significant cancer is highly likely to  be present)    I have personally reviewed the examination and initial interpretation  and I agree with the findings.    KATINA OLSON MD       Scribe Disclosure:  I, Valerie Sanabria, am serving as a scribe to document services personally performed by Sidney Carroll MD at this visit,  based upon the provider's statements to me. All documentation has been reviewed by the aforementioned provider prior to being entered into the official medical record.

## 2020-01-07 NOTE — LETTER
2020       RE: Sidney Preciado  [address] [facility] [address]     Dear Colleague,    Thank you for referring your patient, Sidney Preciado, to the [facility] UROLOGY AND [facility] FOR PROSTATE AND UROLOGIC CANCERS at [facility]. Please see a copy of my visit note below.      Urology Clinic    Sidney Carroll MD  Date of Service: 2020     Name: Sidney Preciado  MRN: [id number]  Age: 69 year old  : 1950  Referring provider: Referred Self     Assessment and Plan:  Assessment:  Sidney Preciado  is a 69 year old male with history of BPH s/p HoLEP on 2019.  Stream is improved. Post void dribble and nocturia with small void volumes is likely related to bladder irritability. Cystoscopy today is negative for any stricture.     Plan:    Increase ditropan to two tablets daily daily.     Follow up in 6 months.   ______________________________________________________________________    HPI  Sidney Preciado is a 69 year old male with a history of BPH s/p HoLEP on 2019.      24 gms of tissue were removed.  Pathology was benign.     After HoLEP stream improved and bladder pressure/pain resolved but has continued to have frequency and urgency, particularly bothersome at night with small volume voids, which has actually worsened compared to initially after surgery. He tried ditropan without relief. He presents today for cystoscopy. He reports that he continues to have nocturia with small voids. He also endorses post void dribbling. He notes that stream after HoLEP was very strong but has slowed down somewhat. Symptoms are tolerable.     AUA    QOL 3/6     Review of Systems:   Pertinent items are noted in HPI or as below, remainder of complete ROS is negative.      Cystoscopy:   After obtaining informed consent, the patient was prepped and draped in the standard sterile fashion.  The 15 [place] flexible cystoscope was inserted through the urethral meatus.      The  anterior urethra was: normal without stricture.    The external sphincter was appropriately coapted.   The prostatic urethra demonstrated big, open HoLEP cavity.   The bladder neck was nonocclusive.    The bladder was unremarkable for tumors, erythema or stones.    The ureteral orifices were identified on each side in orthotopic position with efflux of clear urine.   There were mild trabeculations.    On retroflexion there was the usual bladder neck hyperemia.    There was no intravesical protrusion of the prostate.      The patient tolerated the procedure well without complication.      Laboratory:   I personally reviewed all applicable laboratory data and went over findings with patient  Significant for:    CBC RESULTS:  Recent Labs   Lab Test 08/01/19  1042   WBC 5.6   HGB 14.9           BMP RESULTS:  Recent Labs   Lab Test 08/01/19  1042 07/25/19  0737     --    POTASSIUM 4.0  --    CHLORIDE 107  --    CO2 27  --    ANIONGAP 6  --    GLC 92 92   BUN 21  --    CR 0.96  --    GFRESTIMATED 82  --    GFRESTBLACK >90  --    COSMO 9.0  --        UA RESULTS:   Recent Labs   Lab Test 09/17/19  1328 09/17/19 08/01/19  1109 06/18/19  1315   SG 1.003 1.005 1.020 1.012   URINEPH 7.0 7.0 8.5* 6.0   NITRITE Negative Neg Negative Negative   RBCU 0  --  >182* 2   WBCU 0  --  4,954* <1       CALCIUM RESULTS  Lab Results   Component Value Date    COSMO 9.0 08/01/2019       PSA RESULTS  PSA   Date Value Ref Range Status   04/26/2017 3.02 0 - 4 ug/L Final     Comment:     Assay Method:  Chemiluminescence using Siemens Vista analyzer       Imaging:   I personally reviewed all applicable imaging and went over the below findings with patient.    Results for orders placed or performed during the hospital encounter of 05/31/19   MR Prostate wo & w Contrast    Narrative    MRI PROSTATE: 5/31/2019 4:00 PM    CLINICAL HISTORY: prostate cancer; Benign prostatic hyperplasia with  urinary obstruction; Benign prostatic hyperplasia  with urinary  obstruction     Most Recent PSA: 3.02 ug/L    Comparison: MRI 8/25/2015.    TECHNIQUE:  The following sequences were obtained: High-resolution axial  T2-weighted, coronal T2-weighted, 3D volumetric T2-weighted, axial  pre-contrast T1, axial diffusion-weighted, axial apparent diffusion  coefficient and axial dynamic contrast-enhanced T1. Postcontrast  images were evaluated on a separate workstation to evaluate dynamic  contrast enhancement. The technique of this exam is PI-RADS v2.1  compliant. Contrast dose: 7.5mL Gadavist    FINDINGS:  Size: 59 grams  Hemorrhage: Mild  Peripheral zone: Heterogeneous on T2-weighted images. Regions of  mildly decreased signal on ADC or DWI which are best characterized as  PI-RADS 2 without highly suspicious lesion.  Transition zone: Enlarged with BPH changes. Transition zone nodules  which are circumscribed or mostly encapsulated without diffusion  restriction.  PI-RADS 2.  No highly suspicious nodules.    Neurovascular bundles: No suspicion of involvement by malignancy  Seminal vesicles: Not involved by tumor. No focal abnormality.  Lymph nodes: No adenopathy.  Bones: No suspicious lesions. Mild degenerative changes of the lower  lumbar spine.  Other pelvic organs: Sigmoid diverticulosis. Bladder wall thickening  and trabeculation are probably sequelae of chronic bladder outlet  narrowing.        Impression    IMPRESSION: Based on the most suspicious abnormality, this exam is  characterized as PIRADS 2 - Clinically significant cancer is unlikely  to be present.      PIRADS? v2.1 Assessment Categories   PIRADS 1: Very low (clinically significant cancer is highly unlikely  to be present)   PIRADS 2: Low (clinically significant cancer is unlikely to be  present)   PIRADS 3: Intermediate (the presence of clinically significant cancer  is equivocal)   PIRADS 4: High (clinically significant cancer is likely to be present)    PIRADS 5: Very high (clinically significant cancer  is highly likely to  be present)    I have personally reviewed the examination and initial interpretation  and I agree with the findings.    KATINA OLSON MD       Scribe Disclosure:  I, Valerie Daniele, am serving as a scribe to document services personally performed by Sidney Carroll MD at this visit, based upon the provider's statements to me. All documentation has been reviewed by the aforementioned provider prior to being entered into the official medical record.       Again, thank you for allowing me to participate in the care of your patient.      Sincerely,    Sidney Carroll MD

## 2020-01-07 NOTE — PATIENT INSTRUCTIONS
Please follow up in 6 months with Dr. Carroll.    It was a pleasure meeting with you today.  Thank you for allowing me and my team the privilege of caring for you today.  YOU are the reason we are here, and I truly hope we provided you with the excellent service you deserve.  Please let us know if there is anything else we can do for you so that we can be sure you are leaving completely satisfied with your care experience.        Moe Aguirre, EMT

## 2020-02-16 ENCOUNTER — HEALTH MAINTENANCE LETTER (OUTPATIENT)
Age: 70
End: 2020-02-16

## 2020-06-15 DIAGNOSIS — R39.9 LOWER URINARY TRACT SYMPTOMS: ICD-10-CM

## 2020-06-15 LAB
ALBUMIN UR-MCNC: NEGATIVE MG/DL
APPEARANCE UR: CLEAR
BILIRUB UR QL STRIP: NEGATIVE
COLOR UR AUTO: YELLOW
GLUCOSE UR STRIP-MCNC: NEGATIVE MG/DL
HGB UR QL STRIP: NEGATIVE
KETONES UR STRIP-MCNC: NEGATIVE MG/DL
LEUKOCYTE ESTERASE UR QL STRIP: NEGATIVE
NITRATE UR QL: NEGATIVE
PH UR STRIP: 7 PH (ref 5–7)
RBC #/AREA URNS AUTO: 0 /HPF (ref 0–2)
SOURCE: NORMAL
SP GR UR STRIP: 1.01 (ref 1–1.03)
UROBILINOGEN UR STRIP-MCNC: 0 MG/DL (ref 0–2)
WBC #/AREA URNS AUTO: 0 /HPF (ref 0–5)

## 2020-06-16 LAB
BACTERIA SPEC CULT: NO GROWTH
Lab: NORMAL
SPECIMEN SOURCE: NORMAL

## 2020-06-17 ENCOUNTER — TELEPHONE (OUTPATIENT)
Dept: UROLOGY | Facility: CLINIC | Age: 70
End: 2020-06-17

## 2020-06-17 NOTE — TELEPHONE ENCOUNTER
Sent patient the urine culture result and also my charted patient with negative result  No treatment at this time Irma Hoffman LPN Staff Nurse

## 2020-07-06 ENCOUNTER — TELEPHONE (OUTPATIENT)
Dept: UROLOGY | Facility: CLINIC | Age: 70
End: 2020-07-06

## 2020-07-06 NOTE — TELEPHONE ENCOUNTER
----- Message from Alejandra Booth CMA sent at 7/3/2020  7:20 AM CDT -----  Regarding: Nurse visit  Good morning!    Can we please get patient scheduled for a uroflow/PVR on the nurse schedule next week please?  Thanks much!!    Mirlande  ----- Message -----  From: Sidney Carroll MD  Sent: 7/2/2020   1:55 PM CDT  To: Sandra Aguirre RN, Alejandra Booth CMA    Spoke to him  - can we set up a nurse visit for a flow/PVR, he is having obstructive symptoms which I find surprising.    Thanks  Ayan    ----- Message -----  From: Sandra Aguirre RN  Sent: 7/1/2020   3:13 PM CDT  To: MD Pablo Mendoza,    I haven't had time to call him yet.  Would you mind calling him and then let me know what needs to be done?    Thanks!  Sandra  ----- Message -----  From: Sidney Carroll MD  Sent: 6/18/2020   8:58 AM CDT  To: Sandra Aguirre RN    Hi - Can you please call this patient and see if he is still having bothersome urinary symptoms.  His urine was negative for infection.  If still with symptoms lets have him come in sometime for a nursing visit with full bladder and get uroflow/PVR, thanks!

## 2020-07-08 NOTE — TELEPHONE ENCOUNTER
Left second message for pt to call and schedule a nurse visit in the next 1-2 weeks for uroflow/PVR.

## 2020-07-13 ENCOUNTER — PRE VISIT (OUTPATIENT)
Dept: UROLOGY | Facility: CLINIC | Age: 70
End: 2020-07-13

## 2020-07-21 ENCOUNTER — VIRTUAL VISIT (OUTPATIENT)
Dept: UROLOGY | Facility: CLINIC | Age: 70
End: 2020-07-21
Payer: COMMERCIAL

## 2020-07-21 DIAGNOSIS — N40.0 ENLARGED PROSTATE: Primary | ICD-10-CM

## 2020-07-21 PROBLEM — H52.03 HYPEROPIA, BILATERAL: Status: ACTIVE | Noted: 2019-12-05

## 2020-07-21 PROBLEM — H52.223 REGULAR ASTIGMATISM, BILATERAL: Status: ACTIVE | Noted: 2019-12-05

## 2020-07-21 PROBLEM — Z85.46 PERSONAL HISTORY OF MALIGNANT NEOPLASM OF PROSTATE: Status: ACTIVE | Noted: 2018-04-02

## 2020-07-21 PROBLEM — H52.4 PRESBYOPIA: Status: ACTIVE | Noted: 2019-12-05

## 2020-07-21 PROBLEM — D31.31 CHOROIDAL NEVUS, RIGHT EYE: Status: ACTIVE | Noted: 2019-12-05

## 2020-07-21 RX ORDER — OXYBUTYNIN CHLORIDE 5 MG/1
5 TABLET, EXTENDED RELEASE ORAL
COMMUNITY
Start: 2020-06-11

## 2020-07-21 RX ORDER — ZOLPIDEM TARTRATE 12.5 MG/1
12.5 TABLET, FILM COATED, EXTENDED RELEASE ORAL
COMMUNITY
Start: 2020-06-03

## 2020-07-21 RX ORDER — LORAZEPAM 1 MG/1
1 TABLET ORAL
COMMUNITY
Start: 2020-06-03

## 2020-07-21 RX ORDER — CYCLOBENZAPRINE HCL 10 MG
TABLET ORAL
COMMUNITY
Start: 2020-07-02

## 2020-07-21 ASSESSMENT — PAIN SCALES - GENERAL: PAINLEVEL: NO PAIN (0)

## 2020-07-21 NOTE — NURSING NOTE
Chief Complaint   Patient presents with     RECHECK     BPH/LUTS follow up     Alejandra Booth, CMA

## 2020-07-21 NOTE — LETTER
7/21/2020       RE: Sidney Preciado  [address] [facility] [address]     Dear Colleague,    Thank you for referring your patient, Sidney Preciado, to the [facility] UROLOGY AND INST FOR PROSTATE AND UROLOGIC CANCERS at [facility]. Please see a copy of my visit note below.    Sidney Preciado is a 70 year old male who is being evaluated via a billable telephone visit.                UROLOGY TELEPHONE FOLLOW-UP NOTE           Chief Complaint:   LUTS         Interval Update    Sidney Preciado is a very pleasant 69 yo M with hx of LUTS.  Underwent Rezum 3 years ago and then HoLEP last year.  Since surgery his stream and emptying have been better but he does still have lingering bothersome frequency.  He takes ditropan 1 tablet 5mg ER at [facility].  Currently says that his stream is adequate, denies dysuria or hematuria, main issue is daytime frequency.  Does still wake up 1-2 x per night. WE had last performed a cystoscopy 1/20 which showed no stricture        Labs and Pathology:    I personally reviewed all applicable laboratory data and went over findings with patient  Significant for:    CBC RESULTS:  Recent Labs   Lab Test 08/01/19  1042   WBC 5.6   HGB 14.9           BMP RESULTS:  Recent Labs   Lab Test 08/01/19  1042 07/25/19  0737     --    POTASSIUM 4.0  --    CHLORIDE 107  --    CO2 27  --    ANIONGAP 6  --    GLC 92 92   BUN 21  --    CR 0.96  --    GFRESTIMATED 82  --    GFRESTBLACK >90  --    COSMO 9.0  --        UA RESULTS:   Recent Labs   Lab Test 06/15/20  1327 09/17/19  1328 09/17/19 08/01/19  1109   SG 1.008 1.003 1.005 1.020   URINEPH 7.0 7.0 7.0 8.5*   NITRITE Negative Negative Neg Negative   RBCU 0 0  --  >182*   WBCU 0 0  --  4,954*       PSA RESULTS  PSA   Date Value Ref Range Status   04/26/2017 3.02 0 - 4 ug/L Final     Comment:     Assay Method:  Chemiluminescence using Siemens Vista analyzer           Imaging:    I personally reviewed all applicable  Behavior   Anxiety 3  Depression 3  Pain 0  AVH no  S/I no  H/I no            Intervention  Instructed in med usage and effects, encouraged to verbalize needs. Meds administered as ordered. Pt. Continues to be monitored as ordered per pt. Care plan.          Response  Verbalized understanding, wanted a neb tx. She got it. She has been attending groups. Denies SI at this time.          Plan  Continue to monitor for safety/  every 15 minute monitoring checks. Pt. Continues to be monitored as ordered per pt. Care plan.   imaging and went over the below findings with patient.    Results for orders placed or performed during the hospital encounter of 05/31/19   MR Prostate wo & w Contrast    Narrative    MRI PROSTATE: 5/31/2019 4:00 PM    CLINICAL HISTORY: prostate cancer; Benign prostatic hyperplasia with  urinary obstruction; Benign prostatic hyperplasia with urinary  obstruction     Most Recent PSA: 3.02 ug/L    Comparison: MRI 8/25/2015.    TECHNIQUE:  The following sequences were obtained: High-resolution axial  T2-weighted, coronal T2-weighted, 3D volumetric T2-weighted, axial  pre-contrast T1, axial diffusion-weighted, axial apparent diffusion  coefficient and axial dynamic contrast-enhanced T1. Postcontrast  images were evaluated on a separate workstation to evaluate dynamic  contrast enhancement. The technique of this exam is PI-RADS v2.1  compliant. Contrast dose: 7.5mL Gadavist    FINDINGS:  Size: 59 grams  Hemorrhage: Mild  Peripheral zone: Heterogeneous on T2-weighted images. Regions of  mildly decreased signal on ADC or DWI which are best characterized as  PI-RADS 2 without highly suspicious lesion.  Transition zone: Enlarged with BPH changes. Transition zone nodules  which are circumscribed or mostly encapsulated without diffusion  restriction.  PI-RADS 2.  No highly suspicious nodules.    Neurovascular bundles: No suspicion of involvement by malignancy  Seminal vesicles: Not involved by tumor. No focal abnormality.  Lymph nodes: No adenopathy.  Bones: No suspicious lesions. Mild degenerative changes of the lower  lumbar spine.  Other pelvic organs: Sigmoid diverticulosis. Bladder wall thickening  and trabeculation are probably sequelae of chronic bladder outlet  narrowing.        Impression    IMPRESSION: Based on the most suspicious abnormality, this exam is  characterized as PIRADS 2 - Clinically significant cancer is unlikely  to be present.      PIRADS? v2.1 Assessment Categories   PIRADS 1: Very low (clinically  significant cancer is highly unlikely  to be present)   PIRADS 2: Low (clinically significant cancer is unlikely to be  present)   PIRADS 3: Intermediate (the presence of clinically significant cancer  is equivocal)   PIRADS 4: High (clinically significant cancer is likely to be present)    PIRADS 5: Very high (clinically significant cancer is highly likely to  be present)    I have personally reviewed the examination and initial interpretation  and I agree with the findings.    KATINA OLSON MD              Assessment/Plan   70 year old male with suggestion of overactive bladder  -Reviewed lifestyle and dietary measures to minimize frequency and LUTS  -Will try to change ditropan from PM to AM to see if it helps daytime symptoms  -If no improvement will bring in for UF/PVR  -Update PS             Past Medical History:     Past Medical History:   Diagnosis Date     BPH (benign prostatic hyperplasia)      Hypermetropia, bilateral             Past Surgical History:     Past Surgical History:   Procedure Laterality Date     LASER HOLMIUM ENUCLEATION PROSTATE N/A 7/25/2019    Procedure: Holmium Laser Enucleation of the Prostate;  Surgeon: Sidney Carroll MD;  Location: [facility] OR     PROSTATE SURGERY              Medications     Current Outpatient Medications   Medication     cyclobenzaprine (FLEXERIL) 10 MG tablet     diazepam (VALIUM) 10 MG tablet     LORazepam (ATIVAN) 1 MG tablet     multivitamin, therapeutic (THERA-VIT) TABS     omeprazole (PRILOSEC) 40 MG capsule     oxybutynin ER (DITROPAN-XL) 5 MG 24 hr tablet     tamsulosin (FLOMAX) 0.4 MG capsule     trospium (SANCTURA XR) 60 MG CP24     zolpidem ER (AMBIEN CR) 12.5 MG CR tablet     No current facility-administered medications for this visit.             Family History:     Family History   Problem Relation Age of Onset     Diabetes No family hx of      Glaucoma No family hx of      Macular Degeneration No family hx of             Social History:     Social  "History     Socioeconomic History     Marital status:      Spouse name: Not on file     Number of children: Not on file     Years of education: Not on file     Highest education level: Not on file   Occupational History     Not on file   Social Needs     Financial resource strain: Not on file     Food insecurity     Worry: Not on file     Inability: Not on file     Transportation needs     Medical: Not on file     Non-medical: Not on file   Tobacco Use     Smoking status: Never Smoker     Smokeless tobacco: Never Used   Substance and Sexual Activity     Alcohol use: Yes     Alcohol/week: 0.0 standard drinks     Comment: can of beer/day     Drug use: Never     Sexual activity: Not on file   Lifestyle     Physical activity     Days per week: Not on file     Minutes per session: Not on file     Stress: Not on file   Relationships     Social connections     Talks on phone: Not on file     Gets together: Not on file     Attends Synagogue service: Not on file     Active member of club or organization: Not on file     Attends meetings of clubs or organizations: Not on file     Relationship status: Not on file     Intimate partner violence     Fear of current or ex partner: Not on file     Emotionally abused: Not on file     Physically abused: Not on file     Forced sexual activity: Not on file   Other Topics Concern     Not on file   Social History Narrative     Not on file            Allergies:   Patient has no known allergies.         Review of Systems:  From intake questionnaire   Negative 14 system review except as noted on HPI, nurse's note.        CC:  Lizz Sarmiento      The patient has been notified of following:     \"This telephone visit will be conducted via a call between you and your physician/provider. We have found that certain health care needs can be provided without the need for a physical exam.  This service lets us provide the care you need with a short phone conversation.  If a prescription is " "necessary we can send it directly to your pharmacy.  If lab work is needed we can place an order for that and you can then stop by our lab to have the test done at a later time.    Telephone visits are billed at different rates depending on your insurance coverage. During this emergency period, for some insurers they may be billed the same as an in-person visit.  Please reach out to your insurance provider with any questions.    If during the course of the call the physician/provider feels a telephone visit is not appropriate, you will not be charged for this service.\"    Patient has given verbal consent for Telephone visit?  Yes    What phone number would you like to be contacted at? [phone]    How would you like to obtain your AVS? [email or web address]    Phone call duration: 9 minutes    Sidney Carroll MD          "

## 2020-07-21 NOTE — PROGRESS NOTES
Sidney Preciado is a 70 year old male who is being evaluated via a billable telephone visit.                UROLOGY TELEPHONE FOLLOW-UP NOTE           Chief Complaint:   LUTS         Interval Update    Sidney Preciado is a very pleasant 69 yo M with hx of LUTS.  Underwent Rezum 3 years ago and then HoLEP last year.  Since surgery his stream and emptying have been better but he does still have lingering bothersome frequency.  He takes ditropan 1 tablet 5mg ER at [facility].  Currently says that his stream is adequate, denies dysuria or hematuria, main issue is daytime frequency.  Does still wake up 1-2 x per night. WE had last performed a cystoscopy 1/20 which showed no stricture        Labs and Pathology:    I personally reviewed all applicable laboratory data and went over findings with patient  Significant for:    CBC RESULTS:  Recent Labs   Lab Test 08/01/19  1042   WBC 5.6   HGB 14.9           BMP RESULTS:  Recent Labs   Lab Test 08/01/19  1042 07/25/19  0737     --    POTASSIUM 4.0  --    CHLORIDE 107  --    CO2 27  --    ANIONGAP 6  --    GLC 92 92   BUN 21  --    CR 0.96  --    GFRESTIMATED 82  --    GFRESTBLACK >90  --    COSMO 9.0  --        UA RESULTS:   Recent Labs   Lab Test 06/15/20  1327 09/17/19  1328 09/17/19 08/01/19  1109   SG 1.008 1.003 1.005 1.020   URINEPH 7.0 7.0 7.0 8.5*   NITRITE Negative Negative Neg Negative   RBCU 0 0  --  >182*   WBCU 0 0  --  4,954*       PSA RESULTS  PSA   Date Value Ref Range Status   04/26/2017 3.02 0 - 4 ug/L Final     Comment:     Assay Method:  Chemiluminescence using Siemens Vista analyzer           Imaging:    I personally reviewed all applicable imaging and went over the below findings with patient.    Results for orders placed or performed during the hospital encounter of 05/31/19   MR Prostate wo & w Contrast    Narrative    MRI PROSTATE: 5/31/2019 4:00 PM    CLINICAL HISTORY: prostate cancer; Benign prostatic hyperplasia with  urinary obstruction; Benign  prostatic hyperplasia with urinary  obstruction     Most Recent PSA: 3.02 ug/L    Comparison: MRI 8/25/2015.    TECHNIQUE:  The following sequences were obtained: High-resolution axial  T2-weighted, coronal T2-weighted, 3D volumetric T2-weighted, axial  pre-contrast T1, axial diffusion-weighted, axial apparent diffusion  coefficient and axial dynamic contrast-enhanced T1. Postcontrast  images were evaluated on a separate workstation to evaluate dynamic  contrast enhancement. The technique of this exam is PI-RADS v2.1  compliant. Contrast dose: 7.5mL Gadavist    FINDINGS:  Size: 59 grams  Hemorrhage: Mild  Peripheral zone: Heterogeneous on T2-weighted images. Regions of  mildly decreased signal on ADC or DWI which are best characterized as  PI-RADS 2 without highly suspicious lesion.  Transition zone: Enlarged with BPH changes. Transition zone nodules  which are circumscribed or mostly encapsulated without diffusion  restriction.  PI-RADS 2.  No highly suspicious nodules.    Neurovascular bundles: No suspicion of involvement by malignancy  Seminal vesicles: Not involved by tumor. No focal abnormality.  Lymph nodes: No adenopathy.  Bones: No suspicious lesions. Mild degenerative changes of the lower  lumbar spine.  Other pelvic organs: Sigmoid diverticulosis. Bladder wall thickening  and trabeculation are probably sequelae of chronic bladder outlet  narrowing.        Impression    IMPRESSION: Based on the most suspicious abnormality, this exam is  characterized as PIRADS 2 - Clinically significant cancer is unlikely  to be present.      PIRADS? v2.1 Assessment Categories   PIRADS 1: Very low (clinically significant cancer is highly unlikely  to be present)   PIRADS 2: Low (clinically significant cancer is unlikely to be  present)   PIRADS 3: Intermediate (the presence of clinically significant cancer  is equivocal)   PIRADS 4: High (clinically significant cancer is likely to be present)    PIRADS 5: Very high  (clinically significant cancer is highly likely to  be present)    I have personally reviewed the examination and initial interpretation  and I agree with the findings.    KATINA OLSON MD              Assessment/Plan   70 year old male with suggestion of overactive bladder  -Reviewed lifestyle and dietary measures to minimize frequency and LUTS  -Will try to change ditropan from PM to AM to see if it helps daytime symptoms  -If no improvement will bring in for UF/PVR  -Update PS             Past Medical History:     Past Medical History:   Diagnosis Date     BPH (benign prostatic hyperplasia)      Hypermetropia, bilateral             Past Surgical History:     Past Surgical History:   Procedure Laterality Date     LASER HOLMIUM ENUCLEATION PROSTATE N/A 7/25/2019    Procedure: Holmium Laser Enucleation of the Prostate;  Surgeon: Sidney Carroll MD;  Location: [facility] OR     PROSTATE SURGERY              Medications     Current Outpatient Medications   Medication     cyclobenzaprine (FLEXERIL) 10 MG tablet     diazepam (VALIUM) 10 MG tablet     LORazepam (ATIVAN) 1 MG tablet     multivitamin, therapeutic (THERA-VIT) TABS     omeprazole (PRILOSEC) 40 MG capsule     oxybutynin ER (DITROPAN-XL) 5 MG 24 hr tablet     tamsulosin (FLOMAX) 0.4 MG capsule     trospium (SANCTURA XR) 60 MG CP24     zolpidem ER (AMBIEN CR) 12.5 MG CR tablet     No current facility-administered medications for this visit.             Family History:     Family History   Problem Relation Age of Onset     Diabetes No family hx of      Glaucoma No family hx of      Macular Degeneration No family hx of             Social History:     Social History     Socioeconomic History     Marital status:      Spouse name: Not on file     Number of children: Not on file     Years of education: Not on file     Highest education level: Not on file   Occupational History     Not on file   Social Needs     Financial resource strain: Not on file     Food  "insecurity     Worry: Not on file     Inability: Not on file     Transportation needs     Medical: Not on file     Non-medical: Not on file   Tobacco Use     Smoking status: Never Smoker     Smokeless tobacco: Never Used   Substance and Sexual Activity     Alcohol use: Yes     Alcohol/week: 0.0 standard drinks     Comment: can of beer/day     Drug use: Never     Sexual activity: Not on file   Lifestyle     Physical activity     Days per week: Not on file     Minutes per session: Not on file     Stress: Not on file   Relationships     Social connections     Talks on phone: Not on file     Gets together: Not on file     Attends Rastafari service: Not on file     Active member of club or organization: Not on file     Attends meetings of clubs or organizations: Not on file     Relationship status: Not on file     Intimate partner violence     Fear of current or ex partner: Not on file     Emotionally abused: Not on file     Physically abused: Not on file     Forced sexual activity: Not on file   Other Topics Concern     Not on file   Social History Narrative     Not on file            Allergies:   Patient has no known allergies.         Review of Systems:  From intake questionnaire   Negative 14 system review except as noted on HPI, nurse's note.        CC:  Lizz Sarmiento      The patient has been notified of following:     \"This telephone visit will be conducted via a call between you and your physician/provider. We have found that certain health care needs can be provided without the need for a physical exam.  This service lets us provide the care you need with a short phone conversation.  If a prescription is necessary we can send it directly to your pharmacy.  If lab work is needed we can place an order for that and you can then stop by our lab to have the test done at a later time.    Telephone visits are billed at different rates depending on your insurance coverage. During this emergency period, for some " "insurers they may be billed the same as an in-person visit.  Please reach out to your insurance provider with any questions.    If during the course of the call the physician/provider feels a telephone visit is not appropriate, you will not be charged for this service.\"    Patient has given verbal consent for Telephone visit?  Yes    What phone number would you like to be contacted at? [phone]    How would you like to obtain your AVS? MyChart    Phone call duration: 9 minutes    Sidney Carroll MD      "

## 2020-07-21 NOTE — PATIENT INSTRUCTIONS
Please try switching the oxybutynin to the morning  Please obtain PSA in the next several weeks  Please call us to schedule a uroflow and post-void residual if symptoms are not improved

## 2020-11-22 ENCOUNTER — HEALTH MAINTENANCE LETTER (OUTPATIENT)
Age: 70
End: 2020-11-22

## 2021-04-10 ENCOUNTER — HEALTH MAINTENANCE LETTER (OUTPATIENT)
Age: 71
End: 2021-04-10

## 2021-09-19 ENCOUNTER — HEALTH MAINTENANCE LETTER (OUTPATIENT)
Age: 71
End: 2021-09-19

## 2021-10-07 PROCEDURE — 88304 TISSUE EXAM BY PATHOLOGIST: CPT | Mod: TC,ORL | Performed by: ORTHOPAEDIC SURGERY

## 2021-10-08 ENCOUNTER — LAB REQUISITION (OUTPATIENT)
Dept: LAB | Facility: CLINIC | Age: 71
End: 2021-10-08
Payer: COMMERCIAL

## 2021-10-08 DIAGNOSIS — M72.0 PALMAR FASCIAL FIBROMATOSIS (DUPUYTREN): ICD-10-CM

## 2021-10-11 LAB
PATH REPORT.COMMENTS IMP SPEC: NORMAL
PATH REPORT.COMMENTS IMP SPEC: NORMAL
PATH REPORT.FINAL DX SPEC: NORMAL
PATH REPORT.GROSS SPEC: NORMAL
PATH REPORT.MICROSCOPIC SPEC OTHER STN: NORMAL
PATH REPORT.RELEVANT HX SPEC: NORMAL
PHOTO IMAGE: NORMAL

## 2021-10-11 PROCEDURE — 88304 TISSUE EXAM BY PATHOLOGIST: CPT | Mod: 26 | Performed by: PATHOLOGY

## 2022-05-01 ENCOUNTER — HEALTH MAINTENANCE LETTER (OUTPATIENT)
Age: 72
End: 2022-05-01

## 2022-10-31 ENCOUNTER — TELEPHONE (OUTPATIENT)
Dept: UROLOGY | Facility: CLINIC | Age: 72
End: 2022-10-31

## 2022-11-10 ENCOUNTER — PRE VISIT (OUTPATIENT)
Dept: UROLOGY | Facility: CLINIC | Age: 72
End: 2022-11-10

## 2022-11-10 NOTE — TELEPHONE ENCOUNTER
Reason for visit: Follow-Up    Dx/Hx/Sx: Enlarged prostate    Records/imaging/labs/orders: In EPIC    At Rooming: standard video    Devon Garcia, BREANNE  November 10, 2022  7:06 AM

## 2022-11-15 ENCOUNTER — TELEPHONE (OUTPATIENT)
Dept: UROLOGY | Facility: CLINIC | Age: 72
End: 2022-11-15

## 2022-11-15 ENCOUNTER — VIRTUAL VISIT (OUTPATIENT)
Dept: UROLOGY | Facility: CLINIC | Age: 72
End: 2022-11-15
Payer: COMMERCIAL

## 2022-11-15 DIAGNOSIS — R35.1 NOCTURIA: Primary | ICD-10-CM

## 2022-11-15 PROCEDURE — 99214 OFFICE O/P EST MOD 30 MIN: CPT | Mod: 95 | Performed by: UROLOGY

## 2022-11-15 RX ORDER — OXYBUTYNIN CHLORIDE 5 MG/1
5 TABLET, EXTENDED RELEASE ORAL DAILY
Qty: 30 TABLET | Refills: 3 | Status: SHIPPED | OUTPATIENT
Start: 2022-11-15 | End: 2023-04-12

## 2022-11-15 NOTE — LETTER
11/15/2022       RE: Sidney Preciado  [address] [facility] [address]     Dear Colleague,    Thank you for referring your patient, Sidney Preciado, to the [facility] UROLOGY CLINIC [place] at [facility]. Please see a copy of my visit note below.          UROLOGY OUTPATIENT VISIT      Chief Complaint:   LUTS      Synopsis    Sidney Preciado is a very pleasant AGE: 72 year old year old person    He has had longstanding issues with voiding.  He has low-grade prostate cancer on surveillance   he initially underwent Rezum thermotherapy a couple of years ago followed by laser enucleation which had essentially mitigated his urinary symptoms for a while  He now returns because he is having worsening of his LUTS  Had one night a couple of weeks ago with lots of frequency, dysuria and bother.  Generally he is having worsened LUTS since HoLEP/Rezum  He is up 5x per night, has been like this for the past 9 months  Stream is weak in AM and evening but strong during the day  Has 2 cups coffee/day  No split stream or burning  No hematuria  Not on any medications at the moment  Uncertain if he is waking up to void or for other reasons and then voids because he is awake  Voids at night are small volume         Medications     Current Outpatient Medications   Medication     cyclobenzaprine (FLEXERIL) 10 MG tablet     diazepam (VALIUM) 10 MG tablet     LORazepam (ATIVAN) 1 MG tablet     multivitamin, therapeutic (THERA-VIT) TABS     omeprazole (PRILOSEC) 40 MG capsule     oxybutynin ER (DITROPAN-XL) 5 MG 24 hr tablet     tamsulosin (FLOMAX) 0.4 MG capsule     trospium (SANCTURA XR) 60 MG CP24     zolpidem ER (AMBIEN CR) 12.5 MG CR tablet     No current facility-administered medications for this visit.         The following  distinct labs were reviewed    I personally reviewed all applicable laboratory data and went over findings with patient  Significant for:    CBC  RESULTS:  Recent Labs   Lab Test 08/01/19  1042   WBC 5.6   HGB 14.9           BMP RESULTS:  Recent Labs   Lab Test 08/01/19  1042 07/25/19  0737     --    POTASSIUM 4.0  --    CHLORIDE 107  --    CO2 27  --    ANIONGAP 6  --    GLC 92 92   BUN 21  --    CR 0.96  --    GFRESTIMATED 82  --    GFRESTBLACK >90  --        CALCIUM RESULTS:  Recent Labs   Lab Test 08/01/19  1042   COSMO 9.0       PTH RESULTS:  No results for input(s): PTHI in the last 50105 hours.    HGB A1C RESULTS:  No results found for: A1C    UA RESULTS:   Recent Labs   Lab Test 06/15/20  1327 09/17/19  1328 09/17/19  0000 08/01/19  1109   SG 1.008 1.003 1.005 1.020   URINEPH 7.0 7.0 7.0 8.5*   NITRITE Negative Negative Neg Negative   RBCU 0 0  --  >182*   WBCU 0 0  --  4,954*       PSA RESULTS  PSA   Date Value Ref Range Status   04/26/2017 3.02 0 - 4 ug/L Final     Comment:     Assay Method:  Chemiluminescence using Siemens Vista analyzer            Assessment/Plan   72 year old year old person with lower urinary tract symptoms and prior history of BPH intervention  -Symptoms mainly sound irritative in nature, recommend a trial of Ditropan.  Reviewed side effect profile  -We will also update a PSA and urinalysis  -Recommend he follow-up with a voiding diary and symptom check with an advanced practice provider in the next 4 to 6 weeks  If symptoms do not improve can consider cystoscopy given prior history of invasive prostate treatment    CC:  Lizz Sarmiento      Video-Visit Details    Type of service:  Video Visit    Video Start Time: 11:00    Video End Time:11:15    Originating Location (pt. Location): Home    Distant Location (provider location):  [facility] UROLOGY CLINIC [place]     Platform used for Video Visit: Rosa Botello is a 72 year old who is being evaluated via a billable video visit.      How would you like to obtain your AVS? MyChart  If the video visit is dropped, the invitation should be resent by: Text to  cell phone: [phone]  Will anyone else be joining your video visit? No      Video-Visit Details    Video Start Time: 11:00    Type of service:  Video Visit    Video End Time:11:!5    Originating Location (pt. Location): Home        Distant Location (provider location):  Off-site    Platform used for Video Visit: Rosa Carroll MD

## 2022-11-15 NOTE — PROGRESS NOTES
Ayan is a 72 year old who is being evaluated via a billable video visit.      How would you like to obtain your AVS? [employer]hart  If the video visit is dropped, the invitation should be resent by: Text to cell phone: [phone]  Will anyone else be joining your video visit? No        Video-Visit Details    Video Start Time: 11:00    Type of service:  Video Visit    Video End Time:11:!5    Originating Location (pt. Location): Home        Distant Location (provider location):  Off-site    Platform used for Video Visit: [employer]Well

## 2022-11-15 NOTE — PROGRESS NOTES
UROLOGY OUTPATIENT VISIT      Chief Complaint:   LUTS      Synopsis    Sidney Preciado is a very pleasant AGE: 72 year old year old person    He has had longstanding issues with voiding.  He has low-grade prostate cancer on surveillance   he initially underwent Rezum thermotherapy a couple of years ago followed by laser enucleation which had essentially mitigated his urinary symptoms for a while  He now returns because he is having worsening of his LUTS  Had one night a couple of weeks ago with lots of frequency, dysuria and bother.  Generally he is having worsened LUTS since HoLEP/Rezum  He is up 5x per night, has been like this for the past 9 months  Stream is weak in AM and evening but strong during the day  Has 2 cups coffee/day  No split stream or burning  No hematuria  Not on any medications at the moment  Uncertain if he is waking up to void or for other reasons and then voids because he is awake  Voids at night are small volume         Medications     Current Outpatient Medications   Medication     cyclobenzaprine (FLEXERIL) 10 MG tablet     diazepam (VALIUM) 10 MG tablet     LORazepam (ATIVAN) 1 MG tablet     multivitamin, therapeutic (THERA-VIT) TABS     omeprazole (PRILOSEC) 40 MG capsule     oxybutynin ER (DITROPAN-XL) 5 MG 24 hr tablet     tamsulosin (FLOMAX) 0.4 MG capsule     trospium (SANCTURA XR) 60 MG CP24     zolpidem ER (AMBIEN CR) 12.5 MG CR tablet     No current facility-administered medications for this visit.         The following  distinct labs were reviewed    I personally reviewed all applicable laboratory data and went over findings with patient  Significant for:    CBC RESULTS:  Recent Labs   Lab Test 08/01/19  1042   WBC 5.6   HGB 14.9           BMP RESULTS:  Recent Labs   Lab Test 08/01/19  1042 07/25/19  0737     --    POTASSIUM 4.0  --    CHLORIDE 107  --    CO2 27  --    ANIONGAP 6  --    GLC 92 92   BUN 21  --    CR 0.96  --    GFRESTIMATED 82  --    GFRESTBLACK  >90  --        CALCIUM RESULTS:  Recent Labs   Lab Test 08/01/19  1042   COSMO 9.0       PTH RESULTS:  No results for input(s): PTHI in the last 27062 hours.    HGB A1C RESULTS:  No results found for: A1C    UA RESULTS:   Recent Labs   Lab Test 06/15/20  1327 09/17/19  1328 09/17/19  0000 08/01/19  1109   SG 1.008 1.003 1.005 1.020   URINEPH 7.0 7.0 7.0 8.5*   NITRITE Negative Negative Neg Negative   RBCU 0 0  --  >182*   WBCU 0 0  --  4,954*       PSA RESULTS  PSA   Date Value Ref Range Status   04/26/2017 3.02 0 - 4 ug/L Final     Comment:     Assay Method:  Chemiluminescence using Siemens Vista analyzer            Assessment/Plan   72 year old year old person with lower urinary tract symptoms and prior history of BPH intervention  -Symptoms mainly sound irritative in nature, recommend a trial of Ditropan.  Reviewed side effect profile  -We will also update a PSA and urinalysis  -Recommend he follow-up with a voiding diary and symptom check with an advanced practice provider in the next 4 to 6 weeks  If symptoms do not improve can consider cystoscopy given prior history of invasive prostate treatment    CC:  Lizz Sarmiento      Video-Visit Details    Type of service:  Video Visit    Video Start Time: 11:00    Video End Time:11:15    Originating Location (pt. Location): Home    Distant Location (provider location):  [facility] [place]     Platform used for Video Visit: [employer]

## 2022-11-21 ENCOUNTER — HEALTH MAINTENANCE LETTER (OUTPATIENT)
Age: 72
End: 2022-11-21

## 2023-04-09 DIAGNOSIS — R35.1 NOCTURIA: ICD-10-CM

## 2023-04-12 RX ORDER — OXYBUTYNIN CHLORIDE 5 MG/1
TABLET, EXTENDED RELEASE ORAL
Qty: 90 TABLET | Refills: 3 | Status: SHIPPED | OUTPATIENT
Start: 2023-04-12

## 2023-04-12 NOTE — TELEPHONE ENCOUNTER
OXYBUTYNIN CL ER 5 MG TABLET    Virtual Visit    11/15/2022  [facility] [place]   Sidney Carroll MD  Urology

## 2023-09-16 ENCOUNTER — HEALTH MAINTENANCE LETTER (OUTPATIENT)
Age: 73
End: 2023-09-16

## 2024-11-09 ENCOUNTER — HEALTH MAINTENANCE LETTER (OUTPATIENT)
Age: 74
End: 2024-11-09

## (undated) DEVICE — BLADE MORCELLATOR WOLF PIRANHA 4.75X385MM 49700103

## (undated) DEVICE — LIGHT HANDLE X2

## (undated) DEVICE — SYR 50ML LL W/O NDL 309653

## (undated) DEVICE — TUBING SET PIRANHA 41702208

## (undated) DEVICE — SPECIMEN TRAP TISSUE CONTAINER PIRANHA 2208120

## (undated) DEVICE — SOL NACL 0.9% IRRIG 3000ML BAG 2B7477

## (undated) DEVICE — DRSG GAUZE 4X8" NON21842

## (undated) DEVICE — LINEN TOWEL PACK X5 5464

## (undated) DEVICE — TUBING IRRIG CYSTO/BLADDER SET 81" LF 2C4040

## (undated) DEVICE — Device

## (undated) DEVICE — SYR 70ML TOOMEY 041170

## (undated) DEVICE — CATH FOLEY 3WAY 22FR 30ML SIL 73022SI

## (undated) DEVICE — JELLY LUBRICATING SURGILUBE 2OZ TUBE 0281-0205-02

## (undated) DEVICE — PAD CHUX UNDERPAD 30X36" P3036C

## (undated) DEVICE — BAG URINARY DRAIN LUBRISIL IC 4000ML LF 253509A

## (undated) DEVICE — SOL NACL 0.9% IRRIG 1000ML BOTTLE 2F7124

## (undated) DEVICE — GLOVE PROTEXIS W/NEU-THERA 7.5  2D73TE75

## (undated) DEVICE — SOL WATER IRRIG 1000ML BOTTLE 2F7114

## (undated) DEVICE — STRAP KNEE/BODY 31143004

## (undated) DEVICE — SUCTION MANIFOLD DORNOCH ULTRA CART UL-CL500

## (undated) DEVICE — SUCTION WATERBUG FLOOR PUDDLE VAC 9321

## (undated) DEVICE — TUBING SUCTION MEDI-VAC 1/4"X20' N620A

## (undated) DEVICE — LASER FIBER HOLMIUM SLIMLINE SIS EZ 550  0644-013-01

## (undated) DEVICE — TAPE DURAPORE ADVANCED PURPLE 3" 1590-3

## (undated) DEVICE — LINEN GOWN X4 5410

## (undated) DEVICE — CATH LASER URETERAL 7.1FRX40CM G17797  022403-7.1-40

## (undated) DEVICE — DRSG ABDOMINAL 07 1/2X8" 7197D

## (undated) DEVICE — FILTER PIRANHA DISP 2228.901

## (undated) RX ORDER — CEFAZOLIN SODIUM 2 G/100ML
INJECTION, SOLUTION INTRAVENOUS
Status: DISPENSED
Start: 2019-07-25

## (undated) RX ORDER — LIDOCAINE HYDROCHLORIDE 20 MG/ML
JELLY TOPICAL
Status: DISPENSED
Start: 2019-05-30

## (undated) RX ORDER — FENTANYL CITRATE 50 UG/ML
INJECTION, SOLUTION INTRAMUSCULAR; INTRAVENOUS
Status: DISPENSED
Start: 2019-07-25

## (undated) RX ORDER — LIDOCAINE HYDROCHLORIDE 20 MG/ML
JELLY TOPICAL
Status: DISPENSED
Start: 2020-01-07

## (undated) RX ORDER — ACETAMINOPHEN 325 MG/1
TABLET ORAL
Status: DISPENSED
Start: 2019-07-25

## (undated) RX ORDER — CIPROFLOXACIN 500 MG/1
TABLET, FILM COATED ORAL
Status: DISPENSED
Start: 2017-03-22

## (undated) RX ORDER — ONDANSETRON 2 MG/ML
INJECTION INTRAMUSCULAR; INTRAVENOUS
Status: DISPENSED
Start: 2019-07-25

## (undated) RX ORDER — LIDOCAINE HYDROCHLORIDE 20 MG/ML
INJECTION, SOLUTION EPIDURAL; INFILTRATION; INTRACAUDAL; PERINEURAL
Status: DISPENSED
Start: 2019-07-25

## (undated) RX ORDER — PROPOFOL 10 MG/ML
INJECTION, EMULSION INTRAVENOUS
Status: DISPENSED
Start: 2019-07-25

## (undated) RX ORDER — DEXAMETHASONE SODIUM PHOSPHATE 4 MG/ML
INJECTION, SOLUTION INTRA-ARTICULAR; INTRALESIONAL; INTRAMUSCULAR; INTRAVENOUS; SOFT TISSUE
Status: DISPENSED
Start: 2019-07-25